# Patient Record
Sex: FEMALE | Race: WHITE | NOT HISPANIC OR LATINO | Employment: OTHER | URBAN - NONMETROPOLITAN AREA
[De-identification: names, ages, dates, MRNs, and addresses within clinical notes are randomized per-mention and may not be internally consistent; named-entity substitution may affect disease eponyms.]

---

## 2017-07-18 RX ORDER — LISINOPRIL 40 MG/1
TABLET ORAL
Qty: 30 TABLET | Refills: 6 | Status: SHIPPED | OUTPATIENT
Start: 2017-07-18 | End: 2018-02-27 | Stop reason: SDUPTHER

## 2017-07-18 RX ORDER — METOPROLOL TARTRATE 50 MG/1
TABLET, FILM COATED ORAL
Qty: 60 TABLET | Refills: 0 | Status: SHIPPED | OUTPATIENT
Start: 2017-07-18 | End: 2017-08-21 | Stop reason: SDUPTHER

## 2017-08-21 RX ORDER — METOPROLOL TARTRATE 50 MG/1
50 TABLET, FILM COATED ORAL 2 TIMES DAILY
Qty: 60 TABLET | Refills: 0 | Status: SHIPPED | OUTPATIENT
Start: 2017-08-21 | End: 2017-09-22 | Stop reason: SDUPTHER

## 2017-09-22 RX ORDER — METOPROLOL TARTRATE 50 MG/1
TABLET, FILM COATED ORAL
Qty: 60 TABLET | Refills: 0 | Status: SHIPPED | OUTPATIENT
Start: 2017-09-22 | End: 2017-11-18 | Stop reason: SDUPTHER

## 2017-10-13 RX ORDER — GABAPENTIN 100 MG/1
CAPSULE ORAL
Qty: 90 CAPSULE | Refills: 3 | OUTPATIENT
Start: 2017-10-13

## 2017-11-16 RX ORDER — METOPROLOL TARTRATE 50 MG/1
TABLET, FILM COATED ORAL
Qty: 60 TABLET | Refills: 0 | OUTPATIENT
Start: 2017-11-16

## 2017-11-20 RX ORDER — METOPROLOL TARTRATE 50 MG/1
TABLET, FILM COATED ORAL
Qty: 60 TABLET | Refills: 5 | Status: SHIPPED | OUTPATIENT
Start: 2017-11-20 | End: 2018-05-21 | Stop reason: SDUPTHER

## 2017-12-05 ENCOUNTER — OFFICE VISIT (OUTPATIENT)
Dept: FAMILY MEDICINE CLINIC | Facility: CLINIC | Age: 67
End: 2017-12-05

## 2017-12-05 VITALS
BODY MASS INDEX: 19.2 KG/M2 | HEART RATE: 56 BPM | OXYGEN SATURATION: 98 % | RESPIRATION RATE: 16 BRPM | SYSTOLIC BLOOD PRESSURE: 119 MMHG | DIASTOLIC BLOOD PRESSURE: 66 MMHG | TEMPERATURE: 97.6 F | WEIGHT: 105 LBS

## 2017-12-05 DIAGNOSIS — K59.09 OTHER CONSTIPATION: ICD-10-CM

## 2017-12-05 DIAGNOSIS — E11.9 TYPE 2 DIABETES MELLITUS WITHOUT COMPLICATION, WITHOUT LONG-TERM CURRENT USE OF INSULIN (HCC): ICD-10-CM

## 2017-12-05 DIAGNOSIS — I10 BENIGN ESSENTIAL HYPERTENSION: Primary | ICD-10-CM

## 2017-12-05 DIAGNOSIS — M54.50 CHRONIC BILATERAL LOW BACK PAIN WITHOUT SCIATICA: ICD-10-CM

## 2017-12-05 DIAGNOSIS — G89.29 CHRONIC BILATERAL LOW BACK PAIN WITHOUT SCIATICA: ICD-10-CM

## 2017-12-05 DIAGNOSIS — R63.4 WEIGHT LOSS: ICD-10-CM

## 2017-12-05 DIAGNOSIS — N39.0 ACUTE URINARY TRACT INFECTION: ICD-10-CM

## 2017-12-05 DIAGNOSIS — E78.2 MIXED HYPERLIPIDEMIA: ICD-10-CM

## 2017-12-05 PROCEDURE — 99214 OFFICE O/P EST MOD 30 MIN: CPT | Performed by: INTERNAL MEDICINE

## 2017-12-05 RX ORDER — OXYCODONE AND ACETAMINOPHEN 10; 325 MG/1; MG/1
TABLET ORAL
Refills: 0 | COMMUNITY
Start: 2017-11-11 | End: 2019-06-18

## 2017-12-05 NOTE — PROGRESS NOTES
Subjective   Glendy Garcia is a 67 y.o. female.     Chief Complaint   Patient presents with   • Hyperlipidemia   • Hypertension   • Diabetes   • Urinary Tract Infection   • Constipation   • Back Pain     Hpi:  Patient is here to follow up  on the  blood pressure and cholesterol and  sugar  . The patient  is taking the blood pressure  medications as prescribed and  has had no side effects. The patient  also needs refills on  medications . The patient  is also here to follow up  on  the  cholesterol  and  is trying to follow a diet. The patient is also here to follow on sugar and  Due for labs today , Patient also needs the medications to be refilled today.  She has not seen a cardiologist recently.  Patient advised to make a follow-up appointment.  She also complains of problems urinating with burning sensation.  She does follow-up with the pain clinic for chronic back pain ,  She is a current smoker, she  Got  a pneumovax,  She declines a flu shot ,  She complains of chronic constipation and the linzess helps her ,she is not eating and has lost weight ,she refuses to see specialist and refuses any further testing a this time  Hyperlipidemia   Pertinent negatives include no chest pain or shortness of breath.   Hypertension   Pertinent negatives include no chest pain, headaches, neck pain, palpitations or shortness of breath.   Diabetes   Pertinent negatives for hypoglycemia include no dizziness, headaches or nervousness/anxiousness. Pertinent negatives for diabetes include no chest pain, no fatigue and no weakness.      The following portions of the patient's history were reviewed and updated as appropriate: allergies, current medications, past family history, past medical history, past social history, past surgical history and problem list.    Review of Systems   Constitutional: Negative for appetite change, fatigue and fever.   HENT: Negative for congestion, ear discharge, ear pain, sinus pressure and sore throat.     Eyes: Negative for pain and discharge.   Respiratory: Negative for cough, chest tightness, shortness of breath and wheezing.    Cardiovascular: Negative for chest pain, palpitations and leg swelling.   Gastrointestinal: Positive for constipation. Negative for abdominal pain, blood in stool, diarrhea and nausea.   Endocrine: Negative for cold intolerance and heat intolerance.   Genitourinary: Positive for dysuria. Negative for flank pain and frequency.   Musculoskeletal: Positive for back pain. Negative for joint swelling and neck pain.   Skin: Negative for color change.   Allergic/Immunologic: Negative for environmental allergies and food allergies.   Neurological: Negative for dizziness, weakness, numbness and headaches.   Hematological: Negative for adenopathy. Does not bruise/bleed easily.   Psychiatric/Behavioral: Negative for behavioral problems and dysphoric mood. The patient is not nervous/anxious.          Current Outpatient Prescriptions:   •  albuterol (PROVENTIL HFA) 108 (90 BASE) MCG/ACT inhaler, Inhale 1 puff Every 6 (Six) Hours As Needed for wheezing., Disp: 1 inhaler, Rfl: 11  •  aspirin 325 MG tablet, Take 1 tablet by mouth., Disp: , Rfl:   •  Lancets (FREESTYLE) lancets, , Disp: , Rfl:   •  linaclotide (LINZESS) 145 MCG capsule capsule, Take 1 capsule (145 mcg total) by mouth Daily, Disp: 30 capsule, Rfl: 3  •  lisinopril (PRINIVIL,ZESTRIL) 40 MG tablet, take 1 tablet by mouth once daily, Disp: 30 tablet, Rfl: 6  •  metFORMIN (GLUCOPHAGE) 500 MG tablet, Take 1 tablet (500 mg total) by mouth Daily, Disp: 30 tablet, Rfl: 3  •  metoprolol tartrate (LOPRESSOR) 50 MG tablet, take 1 tablet by mouth twice a day, Disp: 60 tablet, Rfl: 5  •  Omega-3 Fatty Acids (OMEGA 3 PO), Take 2 capsules by mouth 3 (Three) Times a Day., Disp: , Rfl:   •  oxyCODONE-acetaminophen (PERCOCET)  MG per tablet, take 1 tablet by mouth four times a day, Disp: , Rfl: 0  •  rosuvastatin (CRESTOR) 40 MG tablet, Take 1 tablet  by mouth Daily., Disp: 30 tablet, Rfl: 6  •  tiotropium (SPIRIVA HANDIHALER) 18 MCG per inhalation capsule, Spiriva HandiHaler 18 MCG Inhalation Capsule; Patient Sig: Spiriva HandiHaler 18 MCG Inhalation Capsule USE AS DIRECTED.; 0; 28-Jan-2014; Active, Disp: , Rfl:     Objective     Blood pressure 119/66, pulse 56, temperature 97.6 °F (36.4 °C), temperature source Oral, resp. rate 16, weight 47.6 kg (105 lb), SpO2 98 %.    Physical Exam   Constitutional: She is oriented to person, place, and time. She appears well-developed and well-nourished. No distress.   HENT:   Head: Normocephalic and atraumatic.   Right Ear: External ear normal.   Left Ear: External ear normal.   Nose: Nose normal.   Mouth/Throat: Oropharynx is clear and moist.   Eyes: Conjunctivae and EOM are normal. Pupils are equal, round, and reactive to light.   Neck: Neck supple. No thyromegaly present.   Cardiovascular: Normal rate, regular rhythm and normal heart sounds.    Pulmonary/Chest: Effort normal and breath sounds normal. No respiratory distress.   Abdominal: Soft. Bowel sounds are normal. She exhibits no distension. There is no tenderness. There is no rebound.   Musculoskeletal: Normal range of motion. She exhibits no edema.   Lumbar tenderness   Lymphadenopathy:     She has no cervical adenopathy.   Neurological: She is alert and oriented to person, place, and time.   No gross motor or sensory deficits   Skin: Skin is warm. She is not diaphoretic.   Psychiatric: She has a normal mood and affect.   Nursing note and vitals reviewed.      Results for orders placed or performed in visit on 12/05/17   Urine Culture - Urine, Urine, Clean Catch   Result Value Ref Range    Urine Culture Final report     Result 1 No growth    Comprehensive Metabolic Panel   Result Value Ref Range    Glucose 92 74 - 98 mg/dL    BUN 19 7 - 20 mg/dL    Creatinine 1.20 0.60 - 1.30 mg/dL    eGFR Non African Am 45 (L) >60 mL/min/1.73    eGFR African Am 54 (L) >60  mL/min/1.73    BUN/Creatinine Ratio 15.8 7.1 - 23.5    Sodium 140 137 - 145 mmol/L    Potassium 5.2 (H) 3.5 - 5.1 mmol/L    Chloride 102 98 - 107 mmol/L    Total CO2 27.0 26.0 - 30.0 mmol/L    Calcium 10.6 (H) 8.4 - 10.2 mg/dL    Total Protein 7.4 6.3 - 8.2 g/dL    Albumin 4.50 3.50 - 5.00 g/dL    Globulin 2.9 gm/dL    A/G Ratio 1.6 1.0 - 2.0 g/dL    Total Bilirubin 0.6 0.2 - 1.3 mg/dL    Alkaline Phosphatase 52 38 - 126 U/L    AST (SGOT) 31 15 - 46 U/L    ALT (SGPT) 32 13 - 69 U/L   Lipid Panel   Result Value Ref Range    Total Cholesterol 127 0 - 199 mg/dL    Triglycerides 91 <150 mg/dL    HDL Cholesterol 61 (H) 40 - 60 mg/dL    VLDL Cholesterol 18.2 mg/dL    LDL Cholesterol  48 0 - 99 mg/dL   TSH   Result Value Ref Range    TSH 1.730 0.470 - 4.680 mIU/mL   Hemoglobin A1c   Result Value Ref Range    Hemoglobin A1C 5.90 %   CBC & Differential   Result Value Ref Range    WBC 10.55 4.80 - 10.80 10*3/mm3    RBC 3.94 (L) 4.20 - 5.40 10*6/mm3    Hemoglobin 12.1 12.0 - 16.0 g/dL    Hematocrit 37.5 37.0 - 47.0 %    MCV 95.2 81.0 - 99.0 fL    MCH 30.7 27.0 - 31.0 pg    MCHC 32.3 30.0 - 37.0 g/dL    RDW 13.1 11.5 - 14.5 %    Platelets 325 130 - 400 10*3/mm3    Neutrophil Rel % 49.6 37.0 - 80.0 %    Lymphocyte Rel % 36.6 10.0 - 50.0 %    Monocyte Rel % 8.1 0.0 - 12.0 %    Eosinophil Rel % 4.6 0.0 - 7.0 %    Basophil Rel % 0.9 0.0 - 2.5 %    Neutrophils Absolute 5.24 2.00 - 6.90 10*3/mm3    Lymphocytes Absolute 3.86 (H) 0.60 - 3.40 10*3/mm3    Monocytes Absolute 0.85 0.00 - 0.90 10*3/mm3    Eosinophils Absolute 0.49 0.00 - 0.70 10*3/mm3    Basophils Absolute 0.09 0.00 - 0.20 10*3/mm3    Immature Granulocyte Rel % 0.2 0.0 - 0.6 %    Immature Grans Absolute 0.02 0.00 - 0.06 10*3/mm3    nRBC 0.0 0.0 - 0.0 /100 WBC         Assessment/Plan   Glendy was seen today for hyperlipidemia, hypertension, diabetes, urinary tract infection, constipation and back pain.    Diagnoses and all orders for this visit:    Benign essential  hypertension  -     CBC & Differential  -     XR Chest PA & Lateral    Mixed hyperlipidemia  -     Comprehensive Metabolic Panel  -     Lipid Panel  -     CBC & Differential  -     Comprehensive Metabolic Panel  -     Lipid Panel    Type 2 diabetes mellitus without complication, without long-term current use of insulin  -     Hemoglobin A1c  -     Hemoglobin A1c    Acute urinary tract infection  -     Urine Culture - Urine, Urine, Clean Catch    Weight loss  -     TSH    Other constipation    Chronic bilateral low back pain without sciatica    Other orders  -     linaclotide (LINZESS) 145 MCG capsule capsule; Take 1 capsule (145 mcg total) by mouth Daily  -     metFORMIN (GLUCOPHAGE) 500 MG tablet; Take 1 tablet (500 mg total) by mouth Daily        Discussion Summary:   1. benign essential hypertension: Will continue current medication, low-sodium diet advise. Will get a chest xray today  2.back pain: To continue per pain clinic. She had 5 surgeries with dr newman  3.diabetes mellitus: We will decrease to metformin 500mg po daily , and reviewed fasting sugar and A1c - 6.0 , microalbumin today  4. Mixed hyperlipidemia: Will  Continue current medications  and continue low-cholesterol diet, reviewed fasting lipid panel today   5. acute uti : will get   urine culture , oral hydration ,     6. Weight loss : will get labs , she declines ct scan or referral to surgery /GI or any other specialist   7. Constipation : will get labs , continue linzess , oral hydration       Genny Delgado MD

## 2017-12-07 LAB
ALBUMIN SERPL-MCNC: 4.5 G/DL (ref 3.5–5)
ALBUMIN/GLOB SERPL: 1.6 G/DL (ref 1–2)
ALP SERPL-CCNC: 52 U/L (ref 38–126)
ALT SERPL-CCNC: 32 U/L (ref 13–69)
AST SERPL-CCNC: 31 U/L (ref 15–46)
BACTERIA UR CULT: NO GROWTH
BACTERIA UR CULT: NORMAL
BASOPHILS # BLD AUTO: 0.09 10*3/MM3 (ref 0–0.2)
BASOPHILS NFR BLD AUTO: 0.9 % (ref 0–2.5)
BILIRUB SERPL-MCNC: 0.6 MG/DL (ref 0.2–1.3)
BUN SERPL-MCNC: 19 MG/DL (ref 7–20)
BUN/CREAT SERPL: 15.8 (ref 7.1–23.5)
CALCIUM SERPL-MCNC: 10.6 MG/DL (ref 8.4–10.2)
CHLORIDE SERPL-SCNC: 102 MMOL/L (ref 98–107)
CHOLEST SERPL-MCNC: 127 MG/DL (ref 0–199)
CO2 SERPL-SCNC: 27 MMOL/L (ref 26–30)
CREAT SERPL-MCNC: 1.2 MG/DL (ref 0.6–1.3)
EOSINOPHIL # BLD AUTO: 0.49 10*3/MM3 (ref 0–0.7)
EOSINOPHIL NFR BLD AUTO: 4.6 % (ref 0–7)
ERYTHROCYTE [DISTWIDTH] IN BLOOD BY AUTOMATED COUNT: 13.1 % (ref 11.5–14.5)
GFR SERPLBLD CREATININE-BSD FMLA CKD-EPI: 45 ML/MIN/1.73
GFR SERPLBLD CREATININE-BSD FMLA CKD-EPI: 54 ML/MIN/1.73
GLOBULIN SER CALC-MCNC: 2.9 GM/DL
GLUCOSE SERPL-MCNC: 92 MG/DL (ref 74–98)
HBA1C MFR BLD: 5.9 %
HCT VFR BLD AUTO: 37.5 % (ref 37–47)
HDLC SERPL-MCNC: 61 MG/DL (ref 40–60)
HGB BLD-MCNC: 12.1 G/DL (ref 12–16)
IMM GRANULOCYTES # BLD: 0.02 10*3/MM3 (ref 0–0.06)
IMM GRANULOCYTES NFR BLD: 0.2 % (ref 0–0.6)
LDLC SERPL CALC-MCNC: 48 MG/DL (ref 0–99)
LYMPHOCYTES # BLD AUTO: 3.86 10*3/MM3 (ref 0.6–3.4)
LYMPHOCYTES NFR BLD AUTO: 36.6 % (ref 10–50)
MCH RBC QN AUTO: 30.7 PG (ref 27–31)
MCHC RBC AUTO-ENTMCNC: 32.3 G/DL (ref 30–37)
MCV RBC AUTO: 95.2 FL (ref 81–99)
MONOCYTES # BLD AUTO: 0.85 10*3/MM3 (ref 0–0.9)
MONOCYTES NFR BLD AUTO: 8.1 % (ref 0–12)
NEUTROPHILS # BLD AUTO: 5.24 10*3/MM3 (ref 2–6.9)
NEUTROPHILS NFR BLD AUTO: 49.6 % (ref 37–80)
NRBC BLD AUTO-RTO: 0 /100 WBC (ref 0–0)
PLATELET # BLD AUTO: 325 10*3/MM3 (ref 130–400)
POTASSIUM SERPL-SCNC: 5.2 MMOL/L (ref 3.5–5.1)
PROT SERPL-MCNC: 7.4 G/DL (ref 6.3–8.2)
RBC # BLD AUTO: 3.94 10*6/MM3 (ref 4.2–5.4)
SODIUM SERPL-SCNC: 140 MMOL/L (ref 137–145)
TRIGL SERPL-MCNC: 91 MG/DL
TSH SERPL DL<=0.005 MIU/L-ACNC: 1.73 MIU/ML (ref 0.47–4.68)
VLDLC SERPL CALC-MCNC: 18.2 MG/DL
WBC # BLD AUTO: 10.55 10*3/MM3 (ref 4.8–10.8)

## 2017-12-11 ENCOUNTER — TELEPHONE (OUTPATIENT)
Dept: FAMILY MEDICINE CLINIC | Facility: CLINIC | Age: 67
End: 2017-12-11

## 2017-12-12 ENCOUNTER — TELEPHONE (OUTPATIENT)
Dept: FAMILY MEDICINE CLINIC | Facility: CLINIC | Age: 67
End: 2017-12-12

## 2017-12-13 NOTE — TELEPHONE ENCOUNTER
Attempted to contact the patient but she has never answered or returned phone call request.  Left her a general message that all of her pain medications need to come from the pain center.

## 2018-02-05 ENCOUNTER — TELEPHONE (OUTPATIENT)
Dept: INTERNAL MEDICINE | Facility: CLINIC | Age: 68
End: 2018-02-05

## 2018-02-05 NOTE — TELEPHONE ENCOUNTER
Patient called and states she knows she has diverticulits and wants to get something called in and labs ordered. She declined appointment and states she can not make it to [facility] and wants to speak with nurse.

## 2018-02-08 ENCOUNTER — OFFICE VISIT (OUTPATIENT)
Dept: INTERNAL MEDICINE | Facility: CLINIC | Age: 68
End: 2018-02-08

## 2018-02-08 VITALS
WEIGHT: 106 LBS | DIASTOLIC BLOOD PRESSURE: 59 MMHG | SYSTOLIC BLOOD PRESSURE: 101 MMHG | HEART RATE: 57 BPM | BODY MASS INDEX: 19.39 KG/M2 | OXYGEN SATURATION: 100 % | TEMPERATURE: 98 F | RESPIRATION RATE: 16 BRPM

## 2018-02-08 DIAGNOSIS — I10 BENIGN ESSENTIAL HYPERTENSION: Primary | ICD-10-CM

## 2018-02-08 DIAGNOSIS — N39.0 ACUTE URINARY TRACT INFECTION: ICD-10-CM

## 2018-02-08 DIAGNOSIS — Z79.899 ON LONG TERM DRUG THERAPY: ICD-10-CM

## 2018-02-08 DIAGNOSIS — R10.9 LEFT FLANK PAIN: ICD-10-CM

## 2018-02-08 DIAGNOSIS — N89.8 VAGINAL DISCHARGE: ICD-10-CM

## 2018-02-08 DIAGNOSIS — K59.04 CHRONIC IDIOPATHIC CONSTIPATION: ICD-10-CM

## 2018-02-08 DIAGNOSIS — R10.84 GENERALIZED ABDOMINAL PAIN: ICD-10-CM

## 2018-02-08 LAB
ALBUMIN SERPL-MCNC: 4.7 G/DL (ref 3.5–5)
ALBUMIN/GLOB SERPL: 1.6 G/DL (ref 1–2)
ALP SERPL-CCNC: 54 U/L (ref 38–126)
ALT SERPL-CCNC: 30 U/L (ref 13–69)
AST SERPL-CCNC: 28 U/L (ref 15–46)
BASOPHILS # BLD AUTO: 0.07 10*3/MM3 (ref 0–0.2)
BASOPHILS NFR BLD AUTO: 0.7 % (ref 0–2.5)
BILIRUB BLD-MCNC: NEGATIVE MG/DL
BILIRUB SERPL-MCNC: 0.5 MG/DL (ref 0.2–1.3)
BUN SERPL-MCNC: 23 MG/DL (ref 7–20)
BUN/CREAT SERPL: 20.9 (ref 7.1–23.5)
CALCIUM SERPL-MCNC: 10.3 MG/DL (ref 8.4–10.2)
CHLORIDE SERPL-SCNC: 99 MMOL/L (ref 98–107)
CLARITY, POC: CLEAR
CO2 SERPL-SCNC: 29 MMOL/L (ref 26–30)
COLOR UR: YELLOW
CREAT SERPL-MCNC: 1.1 MG/DL (ref 0.6–1.3)
EOSINOPHIL # BLD AUTO: 0.52 10*3/MM3 (ref 0–0.7)
EOSINOPHIL NFR BLD AUTO: 4.9 % (ref 0–7)
ERYTHROCYTE [DISTWIDTH] IN BLOOD BY AUTOMATED COUNT: 13.5 % (ref 11.5–14.5)
GFR SERPLBLD CREATININE-BSD FMLA CKD-EPI: 50 ML/MIN/1.73
GFR SERPLBLD CREATININE-BSD FMLA CKD-EPI: 60 ML/MIN/1.73
GLOBULIN SER CALC-MCNC: 2.9 GM/DL
GLUCOSE SERPL-MCNC: 86 MG/DL (ref 74–98)
GLUCOSE UR STRIP-MCNC: NEGATIVE MG/DL
HCT VFR BLD AUTO: 37.5 % (ref 37–47)
HGB BLD-MCNC: 12.2 G/DL (ref 12–16)
IMM GRANULOCYTES # BLD: 0.03 10*3/MM3 (ref 0–0.06)
IMM GRANULOCYTES NFR BLD: 0.3 % (ref 0–0.6)
KETONES UR QL: NEGATIVE
LEUKOCYTE EST, POC: NEGATIVE
LYMPHOCYTES # BLD AUTO: 4.27 10*3/MM3 (ref 0.6–3.4)
LYMPHOCYTES NFR BLD AUTO: 40.4 % (ref 10–50)
MCH RBC QN AUTO: 31.3 PG (ref 27–31)
MCHC RBC AUTO-ENTMCNC: 32.5 G/DL (ref 30–37)
MCV RBC AUTO: 96.2 FL (ref 81–99)
MONOCYTES # BLD AUTO: 0.87 10*3/MM3 (ref 0–0.9)
MONOCYTES NFR BLD AUTO: 8.2 % (ref 0–12)
NEUTROPHILS # BLD AUTO: 4.81 10*3/MM3 (ref 2–6.9)
NEUTROPHILS NFR BLD AUTO: 45.5 % (ref 37–80)
NITRITE UR-MCNC: NEGATIVE MG/ML
NRBC BLD AUTO-RTO: 0 /100 WBC (ref 0–0)
PH UR: 6 [PH] (ref 5–8)
PLATELET # BLD AUTO: 321 10*3/MM3 (ref 130–400)
POTASSIUM SERPL-SCNC: 5.2 MMOL/L (ref 3.5–5.1)
PROT SERPL-MCNC: 7.6 G/DL (ref 6.3–8.2)
PROT UR STRIP-MCNC: NEGATIVE MG/DL
RBC # BLD AUTO: 3.9 10*6/MM3 (ref 4.2–5.4)
RBC # UR STRIP: NEGATIVE /UL
SODIUM SERPL-SCNC: 141 MMOL/L (ref 137–145)
SP GR UR: 1.01 (ref 1–1.03)
UROBILINOGEN UR QL: NORMAL
WBC # BLD AUTO: 10.57 10*3/MM3 (ref 4.8–10.8)

## 2018-02-08 PROCEDURE — 81003 URINALYSIS AUTO W/O SCOPE: CPT | Performed by: INTERNAL MEDICINE

## 2018-02-08 PROCEDURE — 96372 THER/PROPH/DIAG INJ SC/IM: CPT | Performed by: INTERNAL MEDICINE

## 2018-02-08 PROCEDURE — 99214 OFFICE O/P EST MOD 30 MIN: CPT | Performed by: INTERNAL MEDICINE

## 2018-02-08 RX ORDER — KETOROLAC TROMETHAMINE 30 MG/ML
30 INJECTION, SOLUTION INTRAMUSCULAR; INTRAVENOUS ONCE
Status: COMPLETED | OUTPATIENT
Start: 2018-02-08 | End: 2018-02-08

## 2018-02-08 RX ORDER — CIPROFLOXACIN 500 MG/1
500 TABLET, FILM COATED ORAL 2 TIMES DAILY
Qty: 20 TABLET | Refills: 0 | Status: SHIPPED | OUTPATIENT
Start: 2018-02-08 | End: 2018-02-18

## 2018-02-08 RX ADMIN — KETOROLAC TROMETHAMINE 30 MG: 30 INJECTION, SOLUTION INTRAMUSCULAR; INTRAVENOUS at 16:34

## 2018-02-08 NOTE — PROGRESS NOTES
Subjective   Glendy Garcia is a 67 y.o. female.     Chief Complaint   Patient presents with   • Back Pain     Patient complains of worsening left mid back pain.    • Hypertension   • Urinary Tract Infection   • Abdominal Pain   • Nausea   • Flank Pain   • Constipation       History of Present Illness   Patient is here to follow-up on her blood pressure.  She is also here complaining of abdominal pain.  She has chronic abdominal pain.  She has chronic constipation.  She has not followed up with GI.  She also complains of left flank pain since the past week  .  She has a UTI.  She also complains of yellow vaginal discharge.  Patient states that her symptoms have been worsening    The following portions of the patient's history were reviewed and updated as appropriate: allergies, current medications, past family history, past medical history, past social history, past surgical history and problem list.    Review of Systems   Constitutional: Negative for appetite change, fatigue and fever.   HENT: Negative for congestion, ear discharge, ear pain, sinus pressure and sore throat.    Eyes: Negative for pain and discharge.   Respiratory: Negative for cough, chest tightness, shortness of breath and wheezing.    Cardiovascular: Negative for chest pain, palpitations and leg swelling.   Gastrointestinal: Positive for abdominal pain and constipation. Negative for blood in stool, diarrhea and nausea.   Endocrine: Negative for cold intolerance and heat intolerance.   Genitourinary: Positive for flank pain and vaginal discharge.        Uti   Musculoskeletal: Negative for back pain and joint swelling.   Skin: Negative for color change.   Allergic/Immunologic: Negative for environmental allergies and food allergies.   Neurological: Negative for dizziness, weakness, numbness and headaches.   Hematological: Negative for adenopathy. Does not bruise/bleed easily.   Psychiatric/Behavioral: Negative for behavioral problems and dysphoric  mood. The patient is not nervous/anxious.          Current Outpatient Prescriptions:   •  albuterol (PROVENTIL HFA) 108 (90 BASE) MCG/ACT inhaler, Inhale 1 puff Every 6 (Six) Hours As Needed for wheezing., Disp: 1 inhaler, Rfl: 11  •  aspirin 325 MG tablet, Take 1 tablet by mouth., Disp: , Rfl:   •  Lancets (FREESTYLE) lancets, , Disp: , Rfl:   •  linaclotide (LINZESS) 145 MCG capsule capsule, Take 1 capsule (145 mcg total) by mouth Daily, Disp: 30 capsule, Rfl: 3  •  lisinopril (PRINIVIL,ZESTRIL) 40 MG tablet, take 1 tablet by mouth once daily, Disp: 30 tablet, Rfl: 6  •  metFORMIN (GLUCOPHAGE) 500 MG tablet, Take 1 tablet (500 mg total) by mouth Daily, Disp: 30 tablet, Rfl: 3  •  metoprolol tartrate (LOPRESSOR) 50 MG tablet, take 1 tablet by mouth twice a day, Disp: 60 tablet, Rfl: 5  •  Omega-3 Fatty Acids (OMEGA 3 PO), Take 2 capsules by mouth 3 (Three) Times a Day., Disp: , Rfl:   •  oxyCODONE-acetaminophen (PERCOCET)  MG per tablet, take 1 tablet by mouth four times a day, Disp: , Rfl: 0  •  rosuvastatin (CRESTOR) 40 MG tablet, Take 1 tablet by mouth Daily., Disp: 30 tablet, Rfl: 6  •  tiotropium (SPIRIVA HANDIHALER) 18 MCG per inhalation capsule, Spiriva HandiHaler 18 MCG Inhalation Capsule; Patient Sig: Spiriva HandiHaler 18 MCG Inhalation Capsule USE AS DIRECTED.; 0; 28-Jan-2014; Active, Disp: , Rfl:   •  ciprofloxacin (CIPRO) 500 MG tablet, Take 1 tablet by mouth 2 (Two) Times a Day for 10 days., Disp: 20 tablet, Rfl: 0  No current facility-administered medications for this visit.     Objective     Blood pressure 101/59, pulse 57, temperature 98 °F (36.7 °C), temperature source Oral, resp. rate 16, weight 48.1 kg (106 lb), SpO2 100 %.    Physical Exam   Constitutional: She is oriented to person, place, and time. She appears well-developed and well-nourished. No distress.   HENT:   Head: Normocephalic and atraumatic.   Right Ear: External ear normal.   Left Ear: External ear normal.   Nose: Nose  normal.   Mouth/Throat: Oropharynx is clear and moist.   Eyes: Conjunctivae and EOM are normal. Pupils are equal, round, and reactive to light.   Neck: Neck supple. No thyromegaly present.   Cardiovascular: Normal rate, regular rhythm and normal heart sounds.    Pulmonary/Chest: Effort normal and breath sounds normal. No respiratory distress.   Abdominal: Soft. Bowel sounds are normal. She exhibits no distension. There is no tenderness. There is no rebound.   Musculoskeletal: Normal range of motion. She exhibits no edema.   Left flank tenderness   Lymphadenopathy:     She has no cervical adenopathy.   Neurological: She is alert and oriented to person, place, and time.   No gross motor or sensory deficits   Skin: Skin is warm. She is not diaphoretic.   Psychiatric: She has a normal mood and affect.   Nursing note and vitals reviewed.      Results for orders placed or performed in visit on 02/08/18   POCT urinalysis dipstick, automated   Result Value Ref Range    Color Yellow Yellow, Straw, Dark Yellow, Elaine    Clarity, UA Clear Clear    Glucose, UA Negative Negative, 1000 mg/dL (3+) mg/dL    Bilirubin Negative Negative    Ketones, UA Negative Negative    Specific Gravity  1.015 1.005 - 1.030    Blood, UA Negative Negative    pH, Urine 6.0 5.0 - 8.0    Protein, POC Negative Negative mg/dL    Urobilinogen, UA Normal Normal    Leukocytes Negative Negative    Nitrite, UA Negative Negative         Assessment/Plan   Glendy was seen today for back pain, hypertension, urinary tract infection, abdominal pain, nausea, flank pain and constipation.    Diagnoses and all orders for this visit:    Benign essential hypertension    Left flank pain  -     CT Abdomen Pelvis With Contrast  -     ketorolac (TORADOL) injection 30 mg; Inject 30 mg into the shoulder, thigh, or buttocks 1 (One) Time.    Chronic idiopathic constipation  -     CT Abdomen Pelvis With Contrast  -     Ambulatory Referral to Gastroenterology    Vaginal  discharge  -     Ambulatory Referral to Gynecology    Generalized abdominal pain  -     CBC & Differential  -     Comprehensive Metabolic Panel    Acute urinary tract infection  -     POCT urinalysis dipstick, automated  -     Urine Culture - Urine, Urine, Clean Catch    Other orders  -     ciprofloxacin (CIPRO) 500 MG tablet; Take 1 tablet by mouth 2 (Two) Times a Day for 10 days.    Plan:  1.  Benign essential hypertension: Will continue current medication, low-sodium diet advised  2.Left flank pain: We will obtain CAT scan, I m Toradol given in office today  3.abdominal pain: We will obtain labs, CAT scan and refer to GI  4.  Vaginal discharge: Will refer patient to gynecology  5.acute urinary tract infection: In office UA done, will obtain urine culture, oral hydration, start Cipro  6.  Chronic constipation: Oral hydration advised, we will obtain CAT scan and labs and refer patient to GI  Patient advised to the ER for symptoms worsen         Genny Delgado MD

## 2018-02-11 LAB
BACTERIA UR CULT: NORMAL
BACTERIA UR CULT: NORMAL

## 2018-02-13 ENCOUNTER — HOSPITAL ENCOUNTER (OUTPATIENT)
Dept: CT IMAGING | Facility: HOSPITAL | Age: 68
Discharge: HOME | End: 2018-02-13
Admitting: INTERNAL MEDICINE

## 2018-02-13 PROCEDURE — A9270 NON-COVERED ITEM OR SERVICE: HCPCS | Performed by: INTERNAL MEDICINE

## 2018-02-13 PROCEDURE — 63710000001 BARIUM 2 % SUSPENSION: Performed by: INTERNAL MEDICINE

## 2018-02-13 PROCEDURE — 0 IOPAMIDOL 61 % SOLUTION: Performed by: INTERNAL MEDICINE

## 2018-02-13 PROCEDURE — 74177 CT ABD & PELVIS W/CONTRAST: CPT

## 2018-02-13 RX ADMIN — BARIUM SULFATE 450 ML: 21 SUSPENSION ORAL at 13:30

## 2018-02-13 RX ADMIN — IOPAMIDOL 95 ML: 612 INJECTION, SOLUTION INTRAVENOUS at 14:40

## 2018-02-15 ENCOUNTER — TELEPHONE (OUTPATIENT)
Dept: INTERNAL MEDICINE | Facility: CLINIC | Age: 68
End: 2018-02-15

## 2018-02-15 NOTE — TELEPHONE ENCOUNTER
Pt wants to know if she needs to continue taking antibiotics since her CT came back normal.  Please return call to pt.

## 2018-02-16 NOTE — TELEPHONE ENCOUNTER
Phone number listed [phone] is still not accepting calls, I left a detailed message on [phone] explaining instructions and to call back with any questions.

## 2018-02-27 ENCOUNTER — TELEPHONE (OUTPATIENT)
Dept: FAMILY MEDICINE CLINIC | Facility: CLINIC | Age: 68
End: 2018-02-27

## 2018-02-27 RX ORDER — LISINOPRIL 40 MG/1
TABLET ORAL
Qty: 30 TABLET | Refills: 6 | Status: SHIPPED | OUTPATIENT
Start: 2018-02-27 | End: 2018-09-27 | Stop reason: SDUPTHER

## 2018-02-27 NOTE — TELEPHONE ENCOUNTER
Patient is calling concerning her Lisinopril. She took her last one this morning.      [facility] / [place]

## 2018-03-15 RX ORDER — ROSUVASTATIN CALCIUM 40 MG/1
TABLET, COATED ORAL
Qty: 30 TABLET | Refills: 6 | Status: SHIPPED | OUTPATIENT
Start: 2018-03-15 | End: 2018-11-20 | Stop reason: SDUPTHER

## 2018-05-21 RX ORDER — METOPROLOL TARTRATE 50 MG/1
50 TABLET, FILM COATED ORAL 2 TIMES DAILY
Qty: 60 TABLET | Refills: 2 | Status: SHIPPED | OUTPATIENT
Start: 2018-05-21 | End: 2018-08-20 | Stop reason: SDUPTHER

## 2018-08-21 RX ORDER — METOPROLOL TARTRATE 50 MG/1
TABLET, FILM COATED ORAL
Qty: 60 TABLET | Refills: 2 | Status: SHIPPED | OUTPATIENT
Start: 2018-08-21 | End: 2018-11-20 | Stop reason: SDUPTHER

## 2018-09-28 RX ORDER — LISINOPRIL 40 MG/1
TABLET ORAL
Qty: 30 TABLET | Refills: 6 | Status: SHIPPED | OUTPATIENT
Start: 2018-09-28 | End: 2018-11-20 | Stop reason: SDUPTHER

## 2018-11-20 ENCOUNTER — OFFICE VISIT (OUTPATIENT)
Dept: INTERNAL MEDICINE | Facility: CLINIC | Age: 68
End: 2018-11-20

## 2018-11-20 VITALS
HEART RATE: 69 BPM | BODY MASS INDEX: 20.67 KG/M2 | DIASTOLIC BLOOD PRESSURE: 63 MMHG | WEIGHT: 113 LBS | OXYGEN SATURATION: 100 % | TEMPERATURE: 97.6 F | SYSTOLIC BLOOD PRESSURE: 109 MMHG

## 2018-11-20 DIAGNOSIS — I10 BENIGN ESSENTIAL HYPERTENSION: Primary | ICD-10-CM

## 2018-11-20 DIAGNOSIS — E11.9 CONTROLLED TYPE 2 DIABETES MELLITUS WITHOUT COMPLICATION, WITHOUT LONG-TERM CURRENT USE OF INSULIN (HCC): ICD-10-CM

## 2018-11-20 DIAGNOSIS — Z23 NEED FOR IMMUNIZATION AGAINST INFLUENZA: ICD-10-CM

## 2018-11-20 DIAGNOSIS — K59.00 CONSTIPATION, UNSPECIFIED CONSTIPATION TYPE: ICD-10-CM

## 2018-11-20 DIAGNOSIS — E78.2 MIXED HYPERLIPIDEMIA: ICD-10-CM

## 2018-11-20 DIAGNOSIS — Z12.31 ENCOUNTER FOR SCREENING MAMMOGRAM FOR MALIGNANT NEOPLASM OF BREAST: ICD-10-CM

## 2018-11-20 LAB
ALBUMIN SERPL-MCNC: 4.7 G/DL (ref 3.5–5)
ALBUMIN/GLOB SERPL: 1.8 G/DL (ref 1–2)
ALP SERPL-CCNC: 51 U/L (ref 38–126)
ALT SERPL-CCNC: 30 U/L (ref 13–69)
AST SERPL-CCNC: 38 U/L (ref 15–46)
BASOPHILS # BLD AUTO: 0.07 10*3/MM3 (ref 0–0.2)
BASOPHILS NFR BLD AUTO: 0.7 % (ref 0–2.5)
BILIRUB SERPL-MCNC: 0.5 MG/DL (ref 0.2–1.3)
BUN SERPL-MCNC: 22 MG/DL (ref 7–20)
BUN/CREAT SERPL: 16.9 (ref 7.1–23.5)
CALCIUM SERPL-MCNC: 9.7 MG/DL (ref 8.4–10.2)
CHLORIDE SERPL-SCNC: 95 MMOL/L (ref 98–107)
CHOLEST SERPL-MCNC: 109 MG/DL (ref 0–199)
CO2 SERPL-SCNC: 26 MMOL/L (ref 26–30)
CREAT SERPL-MCNC: 1.3 MG/DL (ref 0.6–1.3)
EOSINOPHIL # BLD AUTO: 0.73 10*3/MM3 (ref 0–0.7)
EOSINOPHIL NFR BLD AUTO: 7.3 % (ref 0–7)
ERYTHROCYTE [DISTWIDTH] IN BLOOD BY AUTOMATED COUNT: 13.4 % (ref 11.5–14.5)
GLOBULIN SER CALC-MCNC: 2.6 GM/DL
GLUCOSE SERPL-MCNC: 88 MG/DL (ref 74–98)
HBA1C MFR BLD: 6.2 %
HCT VFR BLD AUTO: 37.5 % (ref 37–47)
HDLC SERPL-MCNC: 53 MG/DL (ref 40–60)
HGB BLD-MCNC: 12.4 G/DL (ref 12–16)
IMM GRANULOCYTES # BLD: 0.04 10*3/MM3 (ref 0–0.06)
IMM GRANULOCYTES NFR BLD: 0.4 % (ref 0–0.6)
LDLC SERPL CALC-MCNC: 40 MG/DL (ref 0–99)
LYMPHOCYTES # BLD AUTO: 3.62 10*3/MM3 (ref 0.6–3.4)
LYMPHOCYTES NFR BLD AUTO: 36.2 % (ref 10–50)
MCH RBC QN AUTO: 31.2 PG (ref 27–31)
MCHC RBC AUTO-ENTMCNC: 33.1 G/DL (ref 30–37)
MCV RBC AUTO: 94.5 FL (ref 81–99)
MONOCYTES # BLD AUTO: 0.78 10*3/MM3 (ref 0–0.9)
MONOCYTES NFR BLD AUTO: 7.8 % (ref 0–12)
NEUTROPHILS # BLD AUTO: 4.76 10*3/MM3 (ref 2–6.9)
NEUTROPHILS NFR BLD AUTO: 47.6 % (ref 37–80)
NRBC BLD AUTO-RTO: 0 /100 WBC (ref 0–0)
PLATELET # BLD AUTO: 298 10*3/MM3 (ref 130–400)
POTASSIUM SERPL-SCNC: 4.8 MMOL/L (ref 3.5–5.1)
PROT SERPL-MCNC: 7.3 G/DL (ref 6.3–8.2)
RBC # BLD AUTO: 3.97 10*6/MM3 (ref 4.2–5.4)
SODIUM SERPL-SCNC: 130 MMOL/L (ref 137–145)
TRIGL SERPL-MCNC: 80 MG/DL
VLDLC SERPL CALC-MCNC: 16 MG/DL
WBC # BLD AUTO: 10 10*3/MM3 (ref 4.8–10.8)

## 2018-11-20 PROCEDURE — 99214 OFFICE O/P EST MOD 30 MIN: CPT | Performed by: INTERNAL MEDICINE

## 2018-11-20 PROCEDURE — G0008 ADMIN INFLUENZA VIRUS VAC: HCPCS | Performed by: INTERNAL MEDICINE

## 2018-11-20 PROCEDURE — 90662 IIV NO PRSV INCREASED AG IM: CPT | Performed by: INTERNAL MEDICINE

## 2018-11-20 RX ORDER — ROSUVASTATIN CALCIUM 40 MG/1
40 TABLET, COATED ORAL NIGHTLY
Qty: 30 TABLET | Refills: 6 | Status: SHIPPED | OUTPATIENT
Start: 2018-11-20 | End: 2019-10-16 | Stop reason: SDUPTHER

## 2018-11-20 RX ORDER — METOPROLOL TARTRATE 50 MG/1
50 TABLET, FILM COATED ORAL 2 TIMES DAILY
Qty: 60 TABLET | Refills: 5 | Status: SHIPPED | OUTPATIENT
Start: 2018-11-20 | End: 2019-05-22 | Stop reason: SDUPTHER

## 2018-11-20 RX ORDER — LISINOPRIL 40 MG/1
40 TABLET ORAL DAILY
Qty: 30 TABLET | Refills: 6 | Status: SHIPPED | OUTPATIENT
Start: 2018-11-20 | End: 2019-06-28 | Stop reason: SDUPTHER

## 2018-11-20 NOTE — PROGRESS NOTES
Subjective   Glendy Garcia is a 68 y.o. female.     Chief Complaint   Patient presents with   • Diabetes   • Constipation     discuss Linzess    • Hyperlipidemia   • Hypertension       History of Present Illness   HPI: Patient is here to follow up on the blood pressure  The patient is taking the blood pressure medications as prescribed and has had no side effects. The patient is also here to follow up on the cholesterol and is trying to follow a diet. The patient is also here to follow on sugar and  due to get lab work done . The patient also needs refills on medications .  She needs a flu shot and mammogram  Hypertension   Pertinent negatives include no chest pain, palpitations or shortness of breath.   Hyperlipidemia   Pertinent negatives include no chest pain or shortness of breath.   Diabetes   Pertinent negatives for hypoglycemia include no dizziness, nervousness/anxiousness or pallor. Pertinent negatives for diabetes include no chest pain, no shortness of breath  Patient is on acei/arb     The following portions of the patient's history were reviewed and updated as appropriate: allergies, current medications, past family history, past medical history, past social history, past surgical history and problem list.    Review of Systems   Constitutional: Negative for appetite change, fatigue and fever.   HENT: Negative for congestion, ear discharge, ear pain, sinus pressure and sore throat.    Eyes: Negative for pain and discharge.   Respiratory: Negative for cough, chest tightness, shortness of breath and wheezing.    Cardiovascular: Negative for chest pain, palpitations and leg swelling.   Gastrointestinal: Positive for constipation. Negative for abdominal pain, blood in stool, diarrhea and nausea.   Endocrine: Negative for cold intolerance and heat intolerance.   Genitourinary: Negative for dysuria, flank pain and frequency.   Musculoskeletal: Negative for back pain and joint swelling.   Skin: Negative for color  change.   Allergic/Immunologic: Negative for environmental allergies and food allergies.   Neurological: Negative for dizziness, weakness, numbness and headaches.   Hematological: Negative for adenopathy. Does not bruise/bleed easily.   Psychiatric/Behavioral: Negative for behavioral problems and dysphoric mood. The patient is not nervous/anxious.          Current Outpatient Medications:   •  albuterol (PROVENTIL HFA) 108 (90 BASE) MCG/ACT inhaler, Inhale 1 puff Every 6 (Six) Hours As Needed for wheezing., Disp: 1 inhaler, Rfl: 11  •  aspirin 325 MG tablet, Take 1 tablet by mouth., Disp: , Rfl:   •  Lancets (FREESTYLE) lancets, , Disp: , Rfl:   •  linaclotide (LINZESS) 145 MCG capsule capsule, Take 1 capsule by mouth Daily., Disp: 30 capsule, Rfl: 3  •  lisinopril (PRINIVIL,ZESTRIL) 40 MG tablet, Take 1 tablet by mouth Daily., Disp: 30 tablet, Rfl: 6  •  metFORMIN (GLUCOPHAGE) 500 MG tablet, Take 1 tablet by mouth Daily., Disp: 30 tablet, Rfl: 5  •  metoprolol tartrate (LOPRESSOR) 50 MG tablet, Take 1 tablet by mouth 2 (Two) Times a Day., Disp: 60 tablet, Rfl: 5  •  Omega-3 Fatty Acids (OMEGA 3 PO), Take 2 capsules by mouth 3 (Three) Times a Day., Disp: , Rfl:   •  oxyCODONE-acetaminophen (PERCOCET)  MG per tablet, take 1 tablet by mouth four times a day, Disp: , Rfl: 0  •  rosuvastatin (CRESTOR) 40 MG tablet, Take 1 tablet by mouth Every Night., Disp: 30 tablet, Rfl: 6  •  tiotropium (SPIRIVA HANDIHALER) 18 MCG per inhalation capsule, Spiriva HandiHaler 18 MCG Inhalation Capsule; Patient Sig: Spiriva HandiHaler 18 MCG Inhalation Capsule USE AS DIRECTED.; 0; 28-Jan-2014; Active, Disp: , Rfl:     Objective     Blood pressure 109/63, pulse 69, temperature 97.6 °F (36.4 °C), temperature source Temporal, weight 51.3 kg (113 lb), SpO2 100 %, not currently breastfeeding.    Physical Exam   Constitutional: She is oriented to person, place, and time. She appears well-developed and well-nourished. No distress.   HENT:    Head: Normocephalic and atraumatic.   Right Ear: External ear normal.   Left Ear: External ear normal.   Nose: Nose normal.   Mouth/Throat: Oropharynx is clear and moist.   Eyes: Conjunctivae and EOM are normal. Pupils are equal, round, and reactive to light.   Neck: Neck supple. No thyromegaly present.   Cardiovascular: Normal rate, regular rhythm and normal heart sounds.   Pulmonary/Chest: Effort normal and breath sounds normal. No respiratory distress.   Abdominal: Soft. Bowel sounds are normal. She exhibits no distension. There is no tenderness. There is no rebound.   Musculoskeletal: She exhibits no edema.   Walks with walker   Lymphadenopathy:     She has no cervical adenopathy.   Neurological: She is alert and oriented to person, place, and time.   No gross motor or sensory deficits   Skin: Skin is warm. She is not diaphoretic.   Psychiatric: She has a normal mood and affect.   Nursing note and vitals reviewed.      Results for orders placed or performed in visit on 02/08/18   Urine Culture - Urine, Urine, Clean Catch   Result Value Ref Range    Urine Culture Final report     Result 1 Comment    Comprehensive Metabolic Panel   Result Value Ref Range    Glucose 86 74 - 98 mg/dL    BUN 23 (H) 7 - 20 mg/dL    Creatinine 1.10 0.60 - 1.30 mg/dL    eGFR Non African Am 50 (L) >60 mL/min/1.73    eGFR African Am 60 (L) >60 mL/min/1.73    BUN/Creatinine Ratio 20.9 7.1 - 23.5    Sodium 141 137 - 145 mmol/L    Potassium 5.2 (H) 3.5 - 5.1 mmol/L    Chloride 99 98 - 107 mmol/L    Total CO2 29.0 26.0 - 30.0 mmol/L    Calcium 10.3 (H) 8.4 - 10.2 mg/dL    Total Protein 7.6 6.3 - 8.2 g/dL    Albumin 4.70 3.50 - 5.00 g/dL    Globulin 2.9 gm/dL    A/G Ratio 1.6 1.0 - 2.0 g/dL    Total Bilirubin 0.5 0.2 - 1.3 mg/dL    Alkaline Phosphatase 54 38 - 126 U/L    AST (SGOT) 28 15 - 46 U/L    ALT (SGPT) 30 13 - 69 U/L   POCT urinalysis dipstick, automated   Result Value Ref Range    Color Yellow Yellow, Straw, Dark Yellow, Elaine     Clarity, UA Clear Clear    Glucose, UA Negative Negative, 1000 mg/dL (3+) mg/dL    Bilirubin Negative Negative    Ketones, UA Negative Negative    Specific Gravity  1.015 1.005 - 1.030    Blood, UA Negative Negative    pH, Urine 6.0 5.0 - 8.0    Protein, POC Negative Negative mg/dL    Urobilinogen, UA Normal Normal    Leukocytes Negative Negative    Nitrite, UA Negative Negative   CBC & Differential   Result Value Ref Range    WBC 10.57 4.80 - 10.80 10*3/mm3    RBC 3.90 (L) 4.20 - 5.40 10*6/mm3    Hemoglobin 12.2 12.0 - 16.0 g/dL    Hematocrit 37.5 37.0 - 47.0 %    MCV 96.2 81.0 - 99.0 fL    MCH 31.3 (H) 27.0 - 31.0 pg    MCHC 32.5 30.0 - 37.0 g/dL    RDW 13.5 11.5 - 14.5 %    Platelets 321 130 - 400 10*3/mm3    Neutrophil Rel % 45.5 37.0 - 80.0 %    Lymphocyte Rel % 40.4 10.0 - 50.0 %    Monocyte Rel % 8.2 0.0 - 12.0 %    Eosinophil Rel % 4.9 0.0 - 7.0 %    Basophil Rel % 0.7 0.0 - 2.5 %    Neutrophils Absolute 4.81 2.00 - 6.90 10*3/mm3    Lymphocytes Absolute 4.27 (H) 0.60 - 3.40 10*3/mm3    Monocytes Absolute 0.87 0.00 - 0.90 10*3/mm3    Eosinophils Absolute 0.52 0.00 - 0.70 10*3/mm3    Basophils Absolute 0.07 0.00 - 0.20 10*3/mm3    Immature Granulocyte Rel % 0.3 0.0 - 0.6 %    Immature Grans Absolute 0.03 0.00 - 0.06 10*3/mm3    nRBC 0.0 0.0 - 0.0 /100 WBC         Assessment/Plan     ICD-10-CM ICD-9-CM   1. Benign essential hypertension I10 401.1   2. Mixed hyperlipidemia E78.2 272.2   3. Controlled type 2 diabetes mellitus without complication, without long-term current use of insulin (CMS/[facility]) E11.9 250.00   4. Constipation, unspecified constipation type K59.00 564.00   5. Need for immunization against influenza Z23 V04.81   6. Encounter for screening mammogram for malignant neoplasm of breast Z12.31 V76.12     Orders Placed This Encounter   Procedures   • Mammo Screening Digital Tomosynthesis Bilateral With CAD   • Flu Vaccine High Dose PF 65YR+ ([id number])   • Comprehensive Metabolic Panel   • Lipid Panel    • Hemoglobin A1c   • CBC & Differential       Plan:  1.  Benign essential hypertension: Will continue current medication, low-sodium diet advised  2.mixed hyperlipidemia: will obtain   fasting CMP and lipid panel.  Diet and excise counseled,  Will continue current medications  3. Diabetes  Mellitus  : will obtain   fasting CMP  and hba1c  , diet and exercise counseled , Will continue current medications  4.  Constipation: will labs and continue linzess, she doesn't want to see GI , may be drug induced- percoet 10 mg 4 times a day  5. screening of breast cancer: schedule mammogram  6. Need for flu vaccine : given today       Genny Delgado MD

## 2019-01-07 ENCOUNTER — TELEPHONE (OUTPATIENT)
Dept: INTERNAL MEDICINE | Facility: CLINIC | Age: 69
End: 2019-01-07

## 2019-01-07 DIAGNOSIS — G89.4 CHRONIC PAIN SYNDROME: Primary | ICD-10-CM

## 2019-01-07 NOTE — TELEPHONE ENCOUNTER
Patient left voicemail stating that she needs a referral to a pain clinic because her back surgeon does not prescribe pain medication anymore. She would like a referral to [address] Phone: [phone] Fax: [phone], Dr. Pito Howard. Spoke to patient and informed that I would inform Dr. Delgado.

## 2019-01-16 NOTE — TELEPHONE ENCOUNTER
Please order pain clinic referral to Dr. Pritesh Hu in [place], patient states that he is accepting new patients and is really close to her house. Patient would also like to know if you can give her a few Gabapentin to do her until she can get them. She states that she has been out and is in bad pain. Informed I would speak to dr maciel and call her back tomorrow.

## 2019-01-17 NOTE — TELEPHONE ENCOUNTER
Dr vo is FP doctor , if she plans to see him , she doesn't need a referral , she just needs to change pcp !   please dont do a referral to him

## 2019-01-17 NOTE — TELEPHONE ENCOUNTER
Patient has called to insist a referral be sent in asap since she is in so much pain and has been with out pain meds.    Since Dr. Thomason no longer handling pain management then she would like to go ahead to see Dr. Pito Howard at [facility].   She was informed that Dr. Asencio and Karis are in clinic and they will get back to her as soon as they can.

## 2019-01-17 NOTE — TELEPHONE ENCOUNTER
Patient returned your.   call [phone]  She would like call back today please. Thank you .

## 2019-01-17 NOTE — TELEPHONE ENCOUNTER
I called and left a message at Dr. Pritesh Hu's office this morning.  All information that I was able to locate for Dr. Hu indicates that he is a Family Medicine Doctor, so I am attempting to confirm whether he does Pain Management, and then to try and get her scheduled if possible.

## 2019-01-24 ENCOUNTER — TELEPHONE (OUTPATIENT)
Dept: INTERNAL MEDICINE | Facility: CLINIC | Age: 69
End: 2019-01-24

## 2019-01-30 ENCOUNTER — TELEPHONE (OUTPATIENT)
Dept: INTERNAL MEDICINE | Facility: CLINIC | Age: 69
End: 2019-01-30

## 2019-01-30 NOTE — TELEPHONE ENCOUNTER
Patient cannot get into Pain Clinic until March 7th and is requesting a refill on Gabapentin.  Patient can't sleep at night.    Please call to advise.

## 2019-02-14 ENCOUNTER — TELEPHONE (OUTPATIENT)
Dept: INTERNAL MEDICINE | Facility: CLINIC | Age: 69
End: 2019-02-14

## 2019-02-14 NOTE — TELEPHONE ENCOUNTER
Pt had left a VM at 8:27AM stating that she was needing medication sent in for poor circulation because her hands and feet were staying ice cold. I attempted to contact patient back, no answer, so I left a VM informing pt to call the office and schedule an appointment with Dr. Delgado for this issue.

## 2019-03-04 ENCOUNTER — TELEPHONE (OUTPATIENT)
Dept: INTERNAL MEDICINE | Facility: CLINIC | Age: 69
End: 2019-03-04

## 2019-03-04 DIAGNOSIS — N76.1 SUBACUTE VAGINITIS: Primary | ICD-10-CM

## 2019-03-04 NOTE — TELEPHONE ENCOUNTER
Called stating that she is having green discharge from vagina, states that she has douched and that seemed to help, wants to know what she needs to do, states that she can not be seen  [facility]

## 2019-03-11 ENCOUNTER — TELEPHONE (OUTPATIENT)
Dept: INTERNAL MEDICINE | Facility: CLINIC | Age: 69
End: 2019-03-11

## 2019-03-12 NOTE — TELEPHONE ENCOUNTER
Spoke with referral clerks, they advised me that pt needs to call to reschedule appointment   [phone]  Left pt a detailed message of this information

## 2019-03-28 ENCOUNTER — TELEPHONE (OUTPATIENT)
Dept: INTERNAL MEDICINE | Facility: CLINIC | Age: 69
End: 2019-03-28

## 2019-05-22 RX ORDER — METOPROLOL TARTRATE 50 MG/1
TABLET, FILM COATED ORAL
Qty: 60 TABLET | Refills: 5 | Status: SHIPPED | OUTPATIENT
Start: 2019-05-22 | End: 2019-11-18 | Stop reason: SDUPTHER

## 2019-06-03 RX ORDER — LISINOPRIL 40 MG/1
40 TABLET ORAL DAILY
Qty: 30 TABLET | Refills: 6 | OUTPATIENT
Start: 2019-06-03

## 2019-06-03 RX ORDER — METOPROLOL TARTRATE 50 MG/1
50 TABLET, FILM COATED ORAL 2 TIMES DAILY
Qty: 60 TABLET | Refills: 5 | OUTPATIENT
Start: 2019-06-03

## 2019-06-04 ENCOUNTER — TELEPHONE (OUTPATIENT)
Dept: INTERNAL MEDICINE | Facility: CLINIC | Age: 69
End: 2019-06-04

## 2019-06-04 NOTE — TELEPHONE ENCOUNTER
Pt called stating that she needs to have a tooth pulled that her dentist Dr. Robbins will not pull said tooth due her health issues (diabetes) pt will need clearance  I spoke with Karis at [facility] office and she stated that Dr. Robbins is concerned with healing  I advised Karis that Dr. CARLYLE Canales is out of the office until next week, Karis said that if another provider would be willing to look at the pt recent labs A1c and give clearance in writing that would be acceptable pt is in pain with this tooth  Please advise

## 2019-06-18 ENCOUNTER — OFFICE VISIT (OUTPATIENT)
Dept: INTERNAL MEDICINE | Facility: CLINIC | Age: 69
End: 2019-06-18

## 2019-06-18 VITALS
WEIGHT: 112 LBS | HEIGHT: 58 IN | DIASTOLIC BLOOD PRESSURE: 78 MMHG | SYSTOLIC BLOOD PRESSURE: 130 MMHG | RESPIRATION RATE: 16 BRPM | BODY MASS INDEX: 23.51 KG/M2 | HEART RATE: 59 BPM | OXYGEN SATURATION: 98 % | TEMPERATURE: 97.8 F

## 2019-06-18 DIAGNOSIS — E78.2 MIXED HYPERLIPIDEMIA: ICD-10-CM

## 2019-06-18 DIAGNOSIS — R60.0 LOCALIZED EDEMA: ICD-10-CM

## 2019-06-18 DIAGNOSIS — E11.9 CONTROLLED TYPE 2 DIABETES MELLITUS WITHOUT COMPLICATION, WITHOUT LONG-TERM CURRENT USE OF INSULIN (HCC): ICD-10-CM

## 2019-06-18 DIAGNOSIS — I10 BENIGN ESSENTIAL HYPERTENSION: Primary | ICD-10-CM

## 2019-06-18 LAB
ALBUMIN SERPL-MCNC: 4.6 G/DL (ref 3.5–5)
ALBUMIN/GLOB SERPL: 1.5 G/DL (ref 1–2)
ALP SERPL-CCNC: 76 U/L (ref 38–126)
ALT SERPL-CCNC: 24 U/L (ref 13–69)
AST SERPL-CCNC: 34 U/L (ref 15–46)
BASOPHILS # BLD AUTO: 0.1 10*3/MM3 (ref 0–0.2)
BASOPHILS NFR BLD AUTO: 0.9 % (ref 0–1.5)
BILIRUB SERPL-MCNC: 0.4 MG/DL (ref 0.2–1.3)
BUN SERPL-MCNC: 20 MG/DL (ref 7–20)
BUN/CREAT SERPL: 20 (ref 7.1–23.5)
CALCIUM SERPL-MCNC: 9.7 MG/DL (ref 8.4–10.2)
CHLORIDE SERPL-SCNC: 98 MMOL/L (ref 98–107)
CHOLEST SERPL-MCNC: 131 MG/DL (ref 0–199)
CO2 SERPL-SCNC: 27 MMOL/L (ref 26–30)
CREAT SERPL-MCNC: 1 MG/DL (ref 0.6–1.3)
EOSINOPHIL # BLD AUTO: 0.46 10*3/MM3 (ref 0–0.4)
EOSINOPHIL NFR BLD AUTO: 4.2 % (ref 0.3–6.2)
ERYTHROCYTE [DISTWIDTH] IN BLOOD BY AUTOMATED COUNT: 14 % (ref 12.3–15.4)
GLOBULIN SER CALC-MCNC: 3 GM/DL
GLUCOSE SERPL-MCNC: 81 MG/DL (ref 74–98)
HBA1C MFR BLD: 5.9 % (ref 4.8–5.6)
HCT VFR BLD AUTO: 38.2 % (ref 34–46.6)
HDLC SERPL-MCNC: 58 MG/DL (ref 40–60)
HGB BLD-MCNC: 12.4 G/DL (ref 12–15.9)
IMM GRANULOCYTES # BLD AUTO: 0.04 10*3/MM3 (ref 0–0.05)
IMM GRANULOCYTES NFR BLD AUTO: 0.4 % (ref 0–0.5)
LDLC SERPL CALC-MCNC: 52 MG/DL (ref 0–99)
LYMPHOCYTES # BLD AUTO: 3.47 10*3/MM3 (ref 0.7–3.1)
LYMPHOCYTES NFR BLD AUTO: 31.4 % (ref 19.6–45.3)
MCH RBC QN AUTO: 31.2 PG (ref 26.6–33)
MCHC RBC AUTO-ENTMCNC: 32.5 G/DL (ref 31.5–35.7)
MCV RBC AUTO: 96 FL (ref 79–97)
MONOCYTES # BLD AUTO: 0.87 10*3/MM3 (ref 0.1–0.9)
MONOCYTES NFR BLD AUTO: 7.9 % (ref 5–12)
NEUTROPHILS # BLD AUTO: 6.12 10*3/MM3 (ref 1.7–7)
NEUTROPHILS NFR BLD AUTO: 55.2 % (ref 42.7–76)
NRBC BLD AUTO-RTO: 0 /100 WBC (ref 0–0.2)
PLATELET # BLD AUTO: 329 10*3/MM3 (ref 140–450)
POTASSIUM SERPL-SCNC: 4.3 MMOL/L (ref 3.5–5.1)
PROT SERPL-MCNC: 7.6 G/DL (ref 6.3–8.2)
RBC # BLD AUTO: 3.98 10*6/MM3 (ref 3.77–5.28)
SODIUM SERPL-SCNC: 137 MMOL/L (ref 137–145)
TRIGL SERPL-MCNC: 105 MG/DL
VLDLC SERPL CALC-MCNC: 21 MG/DL
WBC # BLD AUTO: 11.06 10*3/MM3 (ref 3.4–10.8)

## 2019-06-18 PROCEDURE — 99214 OFFICE O/P EST MOD 30 MIN: CPT | Performed by: INTERNAL MEDICINE

## 2019-06-18 RX ORDER — HYDROCHLOROTHIAZIDE 12.5 MG/1
12.5 CAPSULE, GELATIN COATED ORAL DAILY PRN
Qty: 30 CAPSULE | Refills: 5 | Status: SHIPPED | OUTPATIENT
Start: 2019-06-18 | End: 2019-12-08 | Stop reason: SDUPTHER

## 2019-06-18 RX ORDER — OXYCODONE HYDROCHLORIDE AND ACETAMINOPHEN 5; 325 MG/1; MG/1
1 TABLET ORAL EVERY 8 HOURS
Refills: 0 | COMMUNITY
Start: 2019-05-24

## 2019-06-18 NOTE — PROGRESS NOTES
Subjective   Glendy Garcia is a 68 y.o. female.     Chief Complaint   Patient presents with   • Diabetes   • Hypertension   • Hyperlipidemia   • Leg Swelling       History of Present Illness   HPI: Patient is here to follow up on the blood pressure  The patient is taking the blood pressure medications as prescribed and has had no side effects. The patient is also here to follow up on the cholesterol and is trying to follow a diet. The patient is also here to follow on sugar and  due to get lab work done . The patient also needs refills on medications .  She complains of leg swelling  Hypertension   Pertinent negatives include no chest pain, palpitations or shortness of breath.   Hyperlipidemia   Pertinent negatives include no chest pain or shortness of breath.   Diabetes   Pertinent negatives for hypoglycemia include no dizziness, nervousness/anxiousness or pallor. Pertinent negatives for diabetes include no chest pain, no shortness of breath  Patient is on acei/arb   The following portions of the patient's history were reviewed and updated as appropriate: allergies, current medications, past family history, past medical history, past social history, past surgical history and problem list.    Review of Systems   Constitutional: Negative for appetite change, fatigue and fever.   HENT: Negative for congestion, ear discharge, ear pain, sinus pressure and sore throat.    Eyes: Negative for pain and discharge.   Respiratory: Negative for cough, chest tightness, shortness of breath and wheezing.    Cardiovascular: Positive for leg swelling. Negative for chest pain and palpitations.   Gastrointestinal: Negative for abdominal pain, blood in stool, constipation, diarrhea and nausea.   Endocrine: Negative for cold intolerance and heat intolerance.   Genitourinary: Negative for dysuria, flank pain and frequency.   Musculoskeletal: Negative for back pain and joint swelling.   Skin: Negative for color change.  "  Allergic/Immunologic: Negative for environmental allergies and food allergies.   Neurological: Negative for dizziness, weakness, numbness and headaches.   Hematological: Negative for adenopathy. Does not bruise/bleed easily.   Psychiatric/Behavioral: Negative for behavioral problems and dysphoric mood. The patient is not nervous/anxious.          Current Outpatient Medications:   •  albuterol (PROVENTIL HFA) 108 (90 BASE) MCG/ACT inhaler, Inhale 1 puff Every 6 (Six) Hours As Needed for wheezing., Disp: 1 inhaler, Rfl: 11  •  aspirin 325 MG tablet, Take 1 tablet by mouth., Disp: , Rfl:   •  Lancets (FREESTYLE) lancets, , Disp: , Rfl:   •  linaclotide (LINZESS) 145 MCG capsule capsule, Take 1 capsule by mouth Daily., Disp: 30 capsule, Rfl: 3  •  lisinopril (PRINIVIL,ZESTRIL) 40 MG tablet, Take 1 tablet by mouth Daily., Disp: 30 tablet, Rfl: 6  •  metFORMIN (GLUCOPHAGE) 500 MG tablet, take 1 tablet by mouth once daily, Disp: 30 tablet, Rfl: 5  •  metoprolol tartrate (LOPRESSOR) 50 MG tablet, take 1 tablet by mouth twice a day, Disp: 60 tablet, Rfl: 5  •  Omega-3 Fatty Acids (OMEGA 3 PO), Take 2 capsules by mouth 3 (Three) Times a Day., Disp: , Rfl:   •  oxyCODONE-acetaminophen (PERCOCET) 5-325 MG per tablet, Take 1 tablet by mouth Every 8 (Eight) Hours., Disp: , Rfl: 0  •  rosuvastatin (CRESTOR) 40 MG tablet, Take 1 tablet by mouth Every Night., Disp: 30 tablet, Rfl: 6  •  tiotropium (SPIRIVA HANDIHALER) 18 MCG per inhalation capsule, Spiriva HandiHaler 18 MCG Inhalation Capsule; Patient Sig: Spiriva HandiHaler 18 MCG Inhalation Capsule USE AS DIRECTED.; 0; 28-Jan-2014; Active, Disp: , Rfl:   •  hydrochlorothiazide (MICROZIDE) 12.5 MG capsule, Take 1 capsule by mouth Daily As Needed (edema)., Disp: 30 capsule, Rfl: 5    Objective     Blood pressure 130/78, pulse 59, temperature 97.8 °F (36.6 °C), resp. rate 16, height 147.3 cm (58\"), weight 50.8 kg (112 lb), SpO2 98 %, not currently breastfeeding.    Physical Exam "   Constitutional: She is oriented to person, place, and time. She appears well-developed and well-nourished. No distress.   HENT:   Head: Normocephalic and atraumatic.   Right Ear: External ear normal.   Left Ear: External ear normal.   Nose: Nose normal.   Mouth/Throat: Oropharynx is clear and moist.   Eyes: Conjunctivae and EOM are normal. Pupils are equal, round, and reactive to light.   Neck: Normal range of motion. Neck supple. No thyromegaly present.   Cardiovascular: Normal rate, regular rhythm and normal heart sounds.   Pulmonary/Chest: Effort normal and breath sounds normal. No respiratory distress. She has no wheezes.   Abdominal: Soft. Bowel sounds are normal. She exhibits no distension. There is no tenderness. There is no guarding.   Musculoskeletal: She exhibits edema.   Walks with walker    Lymphadenopathy:     She has no cervical adenopathy.   Neurological: She is alert and oriented to person, place, and time.   No gross  motor or sensory deficits   Skin: Skin is warm. She is not diaphoretic. No erythema.   Psychiatric: She has a normal mood and affect.   Nursing note and vitals reviewed.    Patient's Body mass index is 23.41 kg/m².        Results for orders placed or performed in visit on 06/18/19   Comprehensive Metabolic Panel   Result Value Ref Range    Glucose 81 74 - 98 mg/dL    BUN 20 7 - 20 mg/dL    Creatinine 1.00 0.60 - 1.30 mg/dL    eGFR Non African Am 55 (L) >60 mL/min/1.73    eGFR African Am 67 >60 mL/min/1.73    BUN/Creatinine Ratio 20.0 7.1 - 23.5    Sodium 137 137 - 145 mmol/L    Potassium 4.3 3.5 - 5.1 mmol/L    Chloride 98 98 - 107 mmol/L    Total CO2 27.0 26.0 - 30.0 mmol/L    Calcium 9.7 8.4 - 10.2 mg/dL    Total Protein 7.6 6.3 - 8.2 g/dL    Albumin 4.60 3.50 - 5.00 g/dL    Globulin 3.0 gm/dL    A/G Ratio 1.5 1.0 - 2.0 g/dL    Total Bilirubin 0.4 0.2 - 1.3 mg/dL    Alkaline Phosphatase 76 38 - 126 U/L    AST (SGOT) 34 15 - 46 U/L    ALT (SGPT) 24 13 - 69 U/L   Lipid Panel   Result  Value Ref Range    Total Cholesterol 131 0 - 199 mg/dL    Triglycerides 105 <150 mg/dL    HDL Cholesterol 58 40 - 60 mg/dL    VLDL Cholesterol 21 mg/dL    LDL Cholesterol  52 0 - 99 mg/dL   Hemoglobin A1c   Result Value Ref Range    Hemoglobin A1C 5.90 (H) 4.80 - 5.60 %   CBC & Differential   Result Value Ref Range    WBC 11.06 (H) 3.40 - 10.80 10*3/mm3    RBC 3.98 3.77 - 5.28 10*6/mm3    Hemoglobin 12.4 12.0 - 15.9 g/dL    Hematocrit 38.2 34.0 - 46.6 %    MCV 96.0 79.0 - 97.0 fL    MCH 31.2 26.6 - 33.0 pg    MCHC 32.5 31.5 - 35.7 g/dL    RDW 14.0 12.3 - 15.4 %    Platelets 329 140 - 450 10*3/mm3    Neutrophil Rel % 55.2 42.7 - 76.0 %    Lymphocyte Rel % 31.4 19.6 - 45.3 %    Monocyte Rel % 7.9 5.0 - 12.0 %    Eosinophil Rel % 4.2 0.3 - 6.2 %    Basophil Rel % 0.9 0.0 - 1.5 %    Neutrophils Absolute 6.12 1.70 - 7.00 10*3/mm3    Lymphocytes Absolute 3.47 (H) 0.70 - 3.10 10*3/mm3    Monocytes Absolute 0.87 0.10 - 0.90 10*3/mm3    Eosinophils Absolute 0.46 (H) 0.00 - 0.40 10*3/mm3    Basophils Absolute 0.10 0.00 - 0.20 10*3/mm3    Immature Granulocyte Rel % 0.4 0.0 - 0.5 %    Immature Grans Absolute 0.04 0.00 - 0.05 10*3/mm3    nRBC 0.0 0.0 - 0.2 /100 WBC         Assessment/Plan   Glendy was seen today for diabetes, hypertension, hyperlipidemia and leg swelling.    Diagnoses and all orders for this visit:    Benign essential hypertension    Mixed hyperlipidemia  -     CBC & Differential  -     Comprehensive Metabolic Panel  -     Lipid Panel    Controlled type 2 diabetes mellitus without complication, without long-term current use of insulin (CMS/[facility])  -     Hemoglobin A1c    Localized edema    Other orders  -     hydrochlorothiazide (MICROZIDE) 12.5 MG capsule; Take 1 capsule by mouth Daily As Needed (edema).      Plan:  1.  Benign essential hypertension: Will continue current medication, low-sodium diet advised  2.mixed hyperlipidemia: will obtain   fasting CMP and lipid panel.  Diet and exercise counseled,  Will  continue current medications  3. Diabetes  Mellitus  : will obtain   fasting CMP  and hba1c  , diet and exercise counseled , Will continue current medications  4. Edema : prn hctz , patient advised to follow-up with cardiology         Genny Delgado MD

## 2019-06-28 RX ORDER — LISINOPRIL 40 MG/1
TABLET ORAL
Qty: 30 TABLET | Refills: 6 | Status: SHIPPED | OUTPATIENT
Start: 2019-06-28 | End: 2020-01-21

## 2019-07-16 ENCOUNTER — TELEPHONE (OUTPATIENT)
Dept: INTERNAL MEDICINE | Facility: CLINIC | Age: 69
End: 2019-07-16

## 2019-07-16 NOTE — TELEPHONE ENCOUNTER
Patient called because she would like Anita or Dr. Delgado to reach out to her pharmacy ([facility] in [place]) to see which of the nicotine patches might be covered by her insurance. Patient states that the pharmacy would know the best options.    This would allow Dr. Delgado to then prescribe the covered patches to that pharmacy.    Please advise.

## 2019-07-17 NOTE — TELEPHONE ENCOUNTER
I'm almost certain the pharmacy checks with insurance as to what is covered  Do you want to write something?

## 2019-07-18 RX ORDER — NICOTINE 21 MG/24HR
1 PATCH, TRANSDERMAL 24 HOURS TRANSDERMAL EVERY 24 HOURS
Qty: 21 PATCH | Refills: 0 | Status: SHIPPED | OUTPATIENT
Start: 2019-07-18 | End: 2020-06-25

## 2019-08-20 ENCOUNTER — TELEPHONE (OUTPATIENT)
Dept: INTERNAL MEDICINE | Facility: CLINIC | Age: 69
End: 2019-08-20

## 2019-10-16 RX ORDER — ROSUVASTATIN CALCIUM 40 MG/1
40 TABLET, COATED ORAL EVERY EVENING
Qty: 30 TABLET | Refills: 0 | Status: SHIPPED | OUTPATIENT
Start: 2019-10-16 | End: 2020-05-28 | Stop reason: SDUPTHER

## 2019-11-11 RX ORDER — METOPROLOL TARTRATE 50 MG/1
TABLET, FILM COATED ORAL
Qty: 60 TABLET | Refills: 5 | OUTPATIENT
Start: 2019-11-11

## 2019-11-19 RX ORDER — METOPROLOL TARTRATE 50 MG/1
TABLET, FILM COATED ORAL
Qty: 60 TABLET | Refills: 3 | Status: SHIPPED | OUTPATIENT
Start: 2019-11-19 | End: 2020-02-24

## 2019-12-10 RX ORDER — HYDROCHLOROTHIAZIDE 12.5 MG/1
CAPSULE, GELATIN COATED ORAL
Qty: 90 CAPSULE | Refills: 0 | Status: SHIPPED | OUTPATIENT
Start: 2019-12-10 | End: 2020-06-25 | Stop reason: SDUPTHER

## 2020-01-21 RX ORDER — LISINOPRIL 40 MG/1
TABLET ORAL
Qty: 30 TABLET | Refills: 1 | Status: SHIPPED | OUTPATIENT
Start: 2020-01-21 | End: 2020-03-19

## 2020-02-24 RX ORDER — METOPROLOL TARTRATE 50 MG/1
TABLET, FILM COATED ORAL
Qty: 60 TABLET | Refills: 2 | Status: SHIPPED | OUTPATIENT
Start: 2020-02-24 | End: 2020-05-28 | Stop reason: SDUPTHER

## 2020-03-19 RX ORDER — LISINOPRIL 40 MG/1
TABLET ORAL
Qty: 30 TABLET | Refills: 0 | Status: SHIPPED | OUTPATIENT
Start: 2020-03-19 | End: 2020-03-26 | Stop reason: SDUPTHER

## 2020-03-26 ENCOUNTER — TELEPHONE (OUTPATIENT)
Dept: INTERNAL MEDICINE | Facility: CLINIC | Age: 70
End: 2020-03-26

## 2020-03-26 RX ORDER — LISINOPRIL 40 MG/1
40 TABLET ORAL DAILY
Qty: 30 TABLET | Refills: 1 | Status: SHIPPED | OUTPATIENT
Start: 2020-03-26 | End: 2020-05-28 | Stop reason: SDUPTHER

## 2020-03-26 NOTE — TELEPHONE ENCOUNTER
"PATIENT CALLED STATING SHES BEEN TESTED FOR COVID BUT POLLY HAVE RESULTS TIL THIS WEEKEND AND WOULD LIKE TO REQUEST SOMETHING TO ASSIST WITH HER BREATHING , SHE STATES \" ITS A UTENSIL U CAN CARRY AROUND AND IT WILL ATTACH TO NOSTRILS AND SUPPLYS OXYGEN: PATIENT DID NOT KNOW NAME OF EQUIPMENT BUT STATES SHES GETTING BAD HEADACHES AND BELIEVES OXYGEN IS NOT GETTING TO HER BRAIN.     DWIGHT [facility]  [phone]        "

## 2020-03-26 NOTE — TELEPHONE ENCOUNTER
PT CALLED TO REQUEST A REFILL FOR lisinopril (PRINIVIL,ZESTRIL) 40 MG tablet.    PT CONTACT [phone]    PHARMACY VERIFIED DWIGHT

## 2020-03-27 ENCOUNTER — TELEPHONE (OUTPATIENT)
Dept: INTERNAL MEDICINE | Facility: CLINIC | Age: 70
End: 2020-03-27

## 2020-03-27 DIAGNOSIS — J43.2 CENTRILOBULAR EMPHYSEMA: Primary | ICD-10-CM

## 2020-03-27 NOTE — TELEPHONE ENCOUNTER
PT CALLED AND STATED THAT SHE WOULD LIKE AN ORDER FOR PORTABLE INOGEN PORTABLE OXYGEN OR MARRERO PORTABLE OXYGEN   -  PT STATES THAT SHE IS GETTING HEADACHES AND IS WORRIED ABOUT HER OXYGEN    [place] [address]   [facility] [id number] [phone]    PT CALL BACK [phone]

## 2020-05-26 RX ORDER — METOPROLOL TARTRATE 50 MG/1
TABLET, FILM COATED ORAL
Qty: 60 TABLET | Refills: 2 | OUTPATIENT
Start: 2020-05-26

## 2020-05-28 RX ORDER — LISINOPRIL 40 MG/1
40 TABLET ORAL DAILY
Qty: 30 TABLET | Refills: 0 | Status: SHIPPED | OUTPATIENT
Start: 2020-05-28 | End: 2020-06-25 | Stop reason: SDUPTHER

## 2020-05-28 RX ORDER — METOPROLOL TARTRATE 50 MG/1
50 TABLET, FILM COATED ORAL 2 TIMES DAILY
Qty: 60 TABLET | Refills: 0 | Status: SHIPPED | OUTPATIENT
Start: 2020-05-28 | End: 2020-06-25 | Stop reason: SDUPTHER

## 2020-05-28 RX ORDER — ROSUVASTATIN CALCIUM 40 MG/1
40 TABLET, COATED ORAL EVERY EVENING
Qty: 30 TABLET | Refills: 0 | Status: SHIPPED | OUTPATIENT
Start: 2020-05-28 | End: 2020-06-25 | Stop reason: SDUPTHER

## 2020-06-25 ENCOUNTER — OFFICE VISIT (OUTPATIENT)
Dept: INTERNAL MEDICINE | Facility: CLINIC | Age: 70
End: 2020-06-25

## 2020-06-25 VITALS
HEART RATE: 63 BPM | BODY MASS INDEX: 24.35 KG/M2 | DIASTOLIC BLOOD PRESSURE: 81 MMHG | OXYGEN SATURATION: 99 % | TEMPERATURE: 97.5 F | WEIGHT: 116 LBS | SYSTOLIC BLOOD PRESSURE: 145 MMHG | RESPIRATION RATE: 16 BRPM | HEIGHT: 58 IN

## 2020-06-25 DIAGNOSIS — I10 BENIGN ESSENTIAL HYPERTENSION: Primary | ICD-10-CM

## 2020-06-25 DIAGNOSIS — R07.9 CHEST PAIN, UNSPECIFIED TYPE: ICD-10-CM

## 2020-06-25 DIAGNOSIS — I73.9 PVD (PERIPHERAL VASCULAR DISEASE) (HCC): ICD-10-CM

## 2020-06-25 DIAGNOSIS — E78.2 MIXED HYPERLIPIDEMIA: ICD-10-CM

## 2020-06-25 DIAGNOSIS — E11.9 CONTROLLED TYPE 2 DIABETES MELLITUS WITHOUT COMPLICATION, WITHOUT LONG-TERM CURRENT USE OF INSULIN (HCC): ICD-10-CM

## 2020-06-25 DIAGNOSIS — I83.899 VARICOSE VEINS OF LOWER EXTREMITY WITH EDEMA, UNSPECIFIED LATERALITY: ICD-10-CM

## 2020-06-25 PROCEDURE — 99214 OFFICE O/P EST MOD 30 MIN: CPT | Performed by: INTERNAL MEDICINE

## 2020-06-25 RX ORDER — ROSUVASTATIN CALCIUM 20 MG/1
20 TABLET, COATED ORAL NIGHTLY
Qty: 30 TABLET | Refills: 4 | Status: SHIPPED | OUTPATIENT
Start: 2020-06-25 | End: 2020-12-17 | Stop reason: SDUPTHER

## 2020-06-25 RX ORDER — LISINOPRIL 40 MG/1
40 TABLET ORAL DAILY
Qty: 30 TABLET | Refills: 4 | Status: SHIPPED | OUTPATIENT
Start: 2020-06-25 | End: 2020-07-13

## 2020-06-25 RX ORDER — HYDROCHLOROTHIAZIDE 12.5 MG/1
12.5 CAPSULE, GELATIN COATED ORAL DAILY
Qty: 30 CAPSULE | Refills: 4 | Status: SHIPPED | OUTPATIENT
Start: 2020-06-25 | End: 2020-12-01 | Stop reason: SDUPTHER

## 2020-06-25 RX ORDER — METOPROLOL TARTRATE 50 MG/1
50 TABLET, FILM COATED ORAL 2 TIMES DAILY
Qty: 60 TABLET | Refills: 4 | Status: SHIPPED | OUTPATIENT
Start: 2020-06-25 | End: 2020-12-01 | Stop reason: SDUPTHER

## 2020-06-25 RX ORDER — ALBUTEROL SULFATE 90 UG/1
2 AEROSOL, METERED RESPIRATORY (INHALATION) EVERY 4 HOURS PRN
Qty: 1 INHALER | Refills: 2 | Status: SHIPPED | OUTPATIENT
Start: 2020-06-25 | End: 2021-05-12 | Stop reason: SDUPTHER

## 2020-06-25 NOTE — PROGRESS NOTES
Subjective   Glendy Garcia is a 69 y.o. female.     Chief Complaint   Patient presents with   • Hypertension     fasting   • Hyperlipidemia   • Diabetes   • Chest Pain   • Leg Swelling       History of Present Illness   HPI: Patient is here to follow up on the blood pressure  The patient is taking the blood pressure medications as prescribed and has had no side effects. The patient is also here to follow up on the cholesterol and sugar and is trying to follow a diet. The patient is   due to get lab work done .  The patient also needs refills on medications .  She is a current smoker, she states she has not seen her cardiologist for the past 3 to 4 years, she complains of intermittent chest pain with leg swelling, she has a history of PVD  Hyperlipidemia   Pertinent negatives include no chest pain or shortness of breath.   Hypertension   Pertinent negatives include no chest pain, palpitations or shortness of breath.    The following portions of the patient's history were reviewed and updated as appropriate: allergies, current medications, past family history, past medical history, past social history, past surgical history and problem list.    Review of Systems   Constitutional: Negative for appetite change, fatigue and fever.   HENT: Negative for congestion, ear discharge, ear pain, sinus pressure and sore throat.    Eyes: Negative for pain and discharge.   Respiratory: Negative for cough, chest tightness, shortness of breath and wheezing.    Cardiovascular: Positive for chest pain and leg swelling. Negative for palpitations.   Gastrointestinal: Negative for abdominal pain, blood in stool, constipation, diarrhea and nausea.   Endocrine: Negative for cold intolerance and heat intolerance.   Genitourinary: Negative for dysuria, flank pain and frequency.   Musculoskeletal: Negative for back pain and joint swelling.   Skin: Negative for color change.   Allergic/Immunologic: Negative for environmental allergies and food  "allergies.   Neurological: Negative for dizziness, weakness, numbness and headaches.   Hematological: Negative for adenopathy. Does not bruise/bleed easily.   Psychiatric/Behavioral: Negative for behavioral problems and dysphoric mood. The patient is not nervous/anxious.          Current Outpatient Medications:   •  aspirin 325 MG tablet, Take 1 tablet by mouth., Disp: , Rfl:   •  hydroCHLOROthiazide (MICROZIDE) 12.5 MG capsule, Take 1 capsule by mouth Daily., Disp: 30 capsule, Rfl: 4  •  Lancets (FREESTYLE) lancets, , Disp: , Rfl:   •  linaclotide (LINZESS) 145 MCG capsule capsule, Take 1 capsule by mouth Daily., Disp: 30 capsule, Rfl: 3  •  lisinopril (PRINIVIL,ZESTRIL) 40 MG tablet, Take 1 tablet by mouth Daily., Disp: 30 tablet, Rfl: 4  •  metFORMIN (GLUCOPHAGE) 500 MG tablet, Take 1 tablet by mouth Daily., Disp: 30 tablet, Rfl: 4  •  metoprolol tartrate (LOPRESSOR) 50 MG tablet, Take 1 tablet by mouth 2 (Two) Times a Day., Disp: 60 tablet, Rfl: 4  •  Omega-3 Fatty Acids (OMEGA 3 PO), Take 2 capsules by mouth 3 (Three) Times a Day., Disp: , Rfl:   •  oxyCODONE-acetaminophen (PERCOCET) 5-325 MG per tablet, Take 1 tablet by mouth Every 8 (Eight) Hours., Disp: , Rfl: 0  •  rosuvastatin (CRESTOR) 20 MG tablet, Take 1 tablet by mouth Every Night., Disp: 30 tablet, Rfl: 4  •  tiotropium (SPIRIVA HANDIHALER) 18 MCG per inhalation capsule, Spiriva HandiHaler 18 MCG Inhalation Capsule; Patient Sig: Spiriva HandiHaler 18 MCG Inhalation Capsule USE AS DIRECTED.; 0; 28-Jan-2014; Active, Disp: , Rfl:   •  umeclidinium-vilanterol (ANORO ELLIPTA) 62.5-25 MCG/INH aerosol powder  inhaler, Inhale 1 puff Daily., Disp: 1 each, Rfl: 5  •  albuterol sulfate  (90 Base) MCG/ACT inhaler, Inhale 2 puffs Every 4 (Four) Hours As Needed for Wheezing., Disp: 1 inhaler, Rfl: 2    Objective     Blood pressure 145/81, pulse 63, temperature 97.5 °F (36.4 °C), resp. rate 16, height 147.3 cm (57.99\"), weight 52.6 kg (116 lb), SpO2 99 %, not " currently breastfeeding.    Physical Exam   Constitutional: She is oriented to person, place, and time. She appears well-developed and well-nourished. No distress.   HENT:   Head: Normocephalic and atraumatic.   Right Ear: External ear normal.   Left Ear: External ear normal.   Nose: Nose normal.   Mouth/Throat: Oropharynx is clear and moist.   Eyes: Pupils are equal, round, and reactive to light. Conjunctivae and EOM are normal.   Neck: Neck supple. No thyromegaly present.   Cardiovascular: Normal rate, regular rhythm and normal heart sounds.   Pulmonary/Chest: Effort normal and breath sounds normal. No respiratory distress.   Abdominal: Soft. Bowel sounds are normal. She exhibits no distension. There is no tenderness. There is no rebound.   Musculoskeletal: She exhibits edema.   walks with walker  Bilateral varicosities   Lymphadenopathy:     She has no cervical adenopathy.   Neurological: She is alert and oriented to person, place, and time.   No gross motor or sensory deficits   Skin: Skin is warm. She is not diaphoretic.   Psychiatric: She has a normal mood and affect.   Nursing note and vitals reviewed.    Patient's Body mass index is 24.25 kg/m². BMI is within normal parameters. No follow-up required..      Results for orders placed or performed in visit on 06/18/19   Comprehensive Metabolic Panel   Result Value Ref Range    Glucose 81 74 - 98 mg/dL    BUN 20 7 - 20 mg/dL    Creatinine 1.00 0.60 - 1.30 mg/dL    eGFR Non African Am 55 (L) >60 mL/min/1.73    eGFR African Am 67 >60 mL/min/1.73    BUN/Creatinine Ratio 20.0 7.1 - 23.5    Sodium 137 137 - 145 mmol/L    Potassium 4.3 3.5 - 5.1 mmol/L    Chloride 98 98 - 107 mmol/L    Total CO2 27.0 26.0 - 30.0 mmol/L    Calcium 9.7 8.4 - 10.2 mg/dL    Total Protein 7.6 6.3 - 8.2 g/dL    Albumin 4.60 3.50 - 5.00 g/dL    Globulin 3.0 gm/dL    A/G Ratio 1.5 1.0 - 2.0 g/dL    Total Bilirubin 0.4 0.2 - 1.3 mg/dL    Alkaline Phosphatase 76 38 - 126 U/L    AST (SGOT) 34 15  - 46 U/L    ALT (SGPT) 24 13 - 69 U/L   Lipid Panel   Result Value Ref Range    Total Cholesterol 131 0 - 199 mg/dL    Triglycerides 105 <150 mg/dL    HDL Cholesterol 58 40 - 60 mg/dL    VLDL Cholesterol 21 mg/dL    LDL Cholesterol  52 0 - 99 mg/dL   Hemoglobin A1c   Result Value Ref Range    Hemoglobin A1C 5.90 (H) 4.80 - 5.60 %   CBC & Differential   Result Value Ref Range    WBC 11.06 (H) 3.40 - 10.80 10*3/mm3    RBC 3.98 3.77 - 5.28 10*6/mm3    Hemoglobin 12.4 12.0 - 15.9 g/dL    Hematocrit 38.2 34.0 - 46.6 %    MCV 96.0 79.0 - 97.0 fL    MCH 31.2 26.6 - 33.0 pg    MCHC 32.5 31.5 - 35.7 g/dL    RDW 14.0 12.3 - 15.4 %    Platelets 329 140 - 450 10*3/mm3    Neutrophil Rel % 55.2 42.7 - 76.0 %    Lymphocyte Rel % 31.4 19.6 - 45.3 %    Monocyte Rel % 7.9 5.0 - 12.0 %    Eosinophil Rel % 4.2 0.3 - 6.2 %    Basophil Rel % 0.9 0.0 - 1.5 %    Neutrophils Absolute 6.12 1.70 - 7.00 10*3/mm3    Lymphocytes Absolute 3.47 (H) 0.70 - 3.10 10*3/mm3    Monocytes Absolute 0.87 0.10 - 0.90 10*3/mm3    Eosinophils Absolute 0.46 (H) 0.00 - 0.40 10*3/mm3    Basophils Absolute 0.10 0.00 - 0.20 10*3/mm3    Immature Granulocyte Rel % 0.4 0.0 - 0.5 %    Immature Grans Absolute 0.04 0.00 - 0.05 10*3/mm3    nRBC 0.0 0.0 - 0.2 /100 WBC         Assessment/Plan   Glendy was seen today for hypertension, hyperlipidemia, diabetes, chest pain and leg swelling.    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  -     lisinopril (PRINIVIL,ZESTRIL) 40 MG tablet; Take 1 tablet by mouth Daily.  -     metFORMIN (GLUCOPHAGE) 500 MG tablet; Take 1 tablet by mouth Daily.  -     metoprolol tartrate (LOPRESSOR) 50 MG tablet; Take 1 tablet by mouth 2 (Two) Times a Day.  -     rosuvastatin (CRESTOR) 20 MG tablet; Take 1 tablet by mouth Every Night.  -     albuterol sulfate  (90 Base) MCG/ACT inhaler; Inhale 2 puffs Every 4 (Four) Hours As Needed for Wheezing.      Plan:  1.  Benign essential hypertension: Will continue current medication, low-sodium diet advised, Counseled to regularly check BP at home with goal averaging <130/80.   2.mixed hyperlipidemia: will obtain   fasting CMP and lipid panel.  Diet and exercise counseled,  Will continue current medications  3.  Chest pain: We will refer patient to cardiology and continue current medication and obtain labs  4.  PVD: We will refer patient to cardiology, she is a current smoker  5.  Varicose veins with edema: We will refer patient to cardiology and continue diuretic  6 .  Diabetes mellitus: We will continue current medication -metformin and obtain labs       Genny Delgado MD

## 2020-06-26 LAB
ALBUMIN SERPL-MCNC: 4.5 G/DL (ref 3.5–5.2)
ALBUMIN/GLOB SERPL: 2 G/DL
ALP SERPL-CCNC: 52 U/L (ref 39–117)
ALT SERPL-CCNC: 12 U/L (ref 1–33)
AST SERPL-CCNC: 22 U/L (ref 1–32)
BASOPHILS # BLD AUTO: 0.1 10*3/MM3 (ref 0–0.2)
BASOPHILS NFR BLD AUTO: 0.8 % (ref 0–1.5)
BILIRUB SERPL-MCNC: 0.3 MG/DL (ref 0.2–1.2)
BUN SERPL-MCNC: 16 MG/DL (ref 8–23)
BUN/CREAT SERPL: 15.1 (ref 7–25)
CALCIUM SERPL-MCNC: 9.5 MG/DL (ref 8.6–10.5)
CHLORIDE SERPL-SCNC: 101 MMOL/L (ref 98–107)
CHOLEST SERPL-MCNC: 115 MG/DL (ref 0–200)
CO2 SERPL-SCNC: 27.4 MMOL/L (ref 22–29)
CREAT SERPL-MCNC: 1.06 MG/DL (ref 0.57–1)
EOSINOPHIL # BLD AUTO: 0.55 10*3/MM3 (ref 0–0.4)
EOSINOPHIL NFR BLD AUTO: 4.7 % (ref 0.3–6.2)
ERYTHROCYTE [DISTWIDTH] IN BLOOD BY AUTOMATED COUNT: 12.8 % (ref 12.3–15.4)
GLOBULIN SER CALC-MCNC: 2.3 GM/DL
GLUCOSE SERPL-MCNC: 98 MG/DL (ref 65–99)
HBA1C MFR BLD: 6 % (ref 4.8–5.6)
HCT VFR BLD AUTO: 35 % (ref 34–46.6)
HDLC SERPL-MCNC: 51 MG/DL (ref 40–60)
HGB BLD-MCNC: 11.6 G/DL (ref 12–15.9)
IMM GRANULOCYTES # BLD AUTO: 0.03 10*3/MM3 (ref 0–0.05)
IMM GRANULOCYTES NFR BLD AUTO: 0.3 % (ref 0–0.5)
LDLC SERPL CALC-MCNC: 46 MG/DL (ref 0–100)
LYMPHOCYTES # BLD AUTO: 3.01 10*3/MM3 (ref 0.7–3.1)
LYMPHOCYTES NFR BLD AUTO: 25.5 % (ref 19.6–45.3)
MCH RBC QN AUTO: 31.2 PG (ref 26.6–33)
MCHC RBC AUTO-ENTMCNC: 33.1 G/DL (ref 31.5–35.7)
MCV RBC AUTO: 94.1 FL (ref 79–97)
MONOCYTES # BLD AUTO: 0.86 10*3/MM3 (ref 0.1–0.9)
MONOCYTES NFR BLD AUTO: 7.3 % (ref 5–12)
NEUTROPHILS # BLD AUTO: 7.25 10*3/MM3 (ref 1.7–7)
NEUTROPHILS NFR BLD AUTO: 61.4 % (ref 42.7–76)
NRBC BLD AUTO-RTO: 0 /100 WBC (ref 0–0.2)
PLATELET # BLD AUTO: 357 10*3/MM3 (ref 140–450)
POTASSIUM SERPL-SCNC: 4.4 MMOL/L (ref 3.5–5.2)
PROT SERPL-MCNC: 6.8 G/DL (ref 6–8.5)
RBC # BLD AUTO: 3.72 10*6/MM3 (ref 3.77–5.28)
SODIUM SERPL-SCNC: 136 MMOL/L (ref 136–145)
TRIGL SERPL-MCNC: 90 MG/DL (ref 0–150)
VLDLC SERPL CALC-MCNC: 18 MG/DL
WBC # BLD AUTO: 11.8 10*3/MM3 (ref 3.4–10.8)

## 2020-07-08 ENCOUNTER — TELEPHONE (OUTPATIENT)
Dept: INTERNAL MEDICINE | Facility: CLINIC | Age: 70
End: 2020-07-08

## 2020-07-08 RX ORDER — FLUCONAZOLE 150 MG/1
150 TABLET ORAL ONCE
Qty: 1 TABLET | Refills: 0 | Status: SHIPPED | OUTPATIENT
Start: 2020-07-08 | End: 2020-07-08

## 2020-07-08 NOTE — TELEPHONE ENCOUNTER
Patient is calling stating that she think she has a yeast infection. Patient is having yellowish white discharge, burning, itching, and swollen. Patient would like to know if Dr. Delgado would call in a prescription for her to treat this. Please advise and called back    [phone]    Confirmed pharmacy

## 2020-07-13 RX ORDER — LISINOPRIL 40 MG/1
40 TABLET ORAL DAILY
Qty: 30 TABLET | Refills: 4 | Status: SHIPPED | OUTPATIENT
Start: 2020-07-13 | End: 2020-12-01 | Stop reason: SDUPTHER

## 2020-07-21 ENCOUNTER — TELEPHONE (OUTPATIENT)
Dept: INTERNAL MEDICINE | Facility: CLINIC | Age: 70
End: 2020-07-21

## 2020-07-21 NOTE — TELEPHONE ENCOUNTER
PT CALLED TO REQUEST A NEW HANDICAP STICKER, STATED IT THAT IT IS ABOUT TO  THE END OF THE MONTH.    PLEASE ADVISE.  CALL BACK:[phone]

## 2020-07-21 NOTE — TELEPHONE ENCOUNTER
PT CALLED BACK WITH FAX NUMBER FOR HER HANDICAPPED STICKER TO [facility]    FAX: [phone]

## 2020-08-28 ENCOUNTER — TELEPHONE (OUTPATIENT)
Dept: INTERNAL MEDICINE | Facility: CLINIC | Age: 70
End: 2020-08-28

## 2020-09-09 ENCOUNTER — TELEPHONE (OUTPATIENT)
Dept: INTERNAL MEDICINE | Facility: CLINIC | Age: 70
End: 2020-09-09

## 2020-09-09 RX ORDER — PANTOPRAZOLE SODIUM 40 MG/1
40 TABLET, DELAYED RELEASE ORAL DAILY
Qty: 30 TABLET | Refills: 6 | Status: SHIPPED | OUTPATIENT
Start: 2020-09-09 | End: 2020-10-09

## 2020-09-09 NOTE — TELEPHONE ENCOUNTER
PATIENT CALLED AND STATED SHE HAS A SOUR AND SOUR TASTE IN HER MOUTH. PATIENT WOULD LIKE THE PROVIDER TO CALL IN [facility] FOR HER     [facility] [phone]    PLEASE CALL AND ADVISE [phone]

## 2020-11-23 RX ORDER — HYDROCHLOROTHIAZIDE 12.5 MG/1
12.5 CAPSULE, GELATIN COATED ORAL DAILY
Qty: 150 CAPSULE | OUTPATIENT
Start: 2020-11-23

## 2020-11-23 RX ORDER — METOPROLOL TARTRATE 50 MG/1
TABLET, FILM COATED ORAL
Qty: 300 TABLET | OUTPATIENT
Start: 2020-11-23

## 2020-11-23 RX ORDER — ROSUVASTATIN CALCIUM 20 MG/1
20 TABLET, COATED ORAL NIGHTLY
Qty: 90 TABLET | Refills: 3 | OUTPATIENT
Start: 2020-11-23

## 2020-11-23 NOTE — TELEPHONE ENCOUNTER
Attempted to contact patient; left detailed message per release that appointment was needed before further refills

## 2020-11-24 RX ORDER — HYDROCHLOROTHIAZIDE 12.5 MG/1
12.5 CAPSULE, GELATIN COATED ORAL DAILY
Qty: 30 CAPSULE | Refills: 4 | OUTPATIENT
Start: 2020-11-24

## 2020-11-24 RX ORDER — ROSUVASTATIN CALCIUM 20 MG/1
20 TABLET, COATED ORAL NIGHTLY
Qty: 30 TABLET | Refills: 4 | OUTPATIENT
Start: 2020-11-24

## 2020-11-24 RX ORDER — METOPROLOL TARTRATE 50 MG/1
50 TABLET, FILM COATED ORAL 2 TIMES DAILY
Qty: 60 TABLET | Refills: 4 | OUTPATIENT
Start: 2020-11-24

## 2020-11-30 RX ORDER — LISINOPRIL 40 MG/1
40 TABLET ORAL DAILY
Qty: 30 TABLET | Refills: 4 | OUTPATIENT
Start: 2020-11-30

## 2020-11-30 NOTE — TELEPHONE ENCOUNTER
Caller: Glendy Garcia    Relationship: Self    Best call back number: 11/30/2020    Medication needed:   Requested Prescriptions     Pending Prescriptions Disp Refills   • metFORMIN (GLUCOPHAGE) 500 MG tablet 30 tablet 4     Sig: Take 1 tablet by mouth Daily.   • lisinopril (PRINIVIL,ZESTRIL) 40 MG tablet 30 tablet 4     Sig: Take 1 tablet by mouth Daily.       When do you need the refill by: 11/302020    What details did the patient provide when requesting the medication: PATIENT IS COMPLETELY OUT OF MEDICATION  AND NBEEDS REFILL     Does the patient have less than a 3 day supply:  [x] Yes  [] No    What is the patient's [id number] [address] AT [facility] [phone] [facility] [phone] FX

## 2020-12-01 ENCOUNTER — DOCUMENTATION (OUTPATIENT)
Dept: INTERNAL MEDICINE | Facility: CLINIC | Age: 70
End: 2020-12-01

## 2020-12-01 DIAGNOSIS — E78.2 MIXED HYPERLIPIDEMIA: Primary | ICD-10-CM

## 2020-12-01 DIAGNOSIS — E11.9 DIABETES MELLITUS WITHOUT COMPLICATION (HCC): ICD-10-CM

## 2020-12-01 RX ORDER — METOPROLOL TARTRATE 50 MG/1
50 TABLET, FILM COATED ORAL 2 TIMES DAILY
Qty: 28 TABLET | Refills: 0 | Status: SHIPPED | OUTPATIENT
Start: 2020-12-01 | End: 2020-12-17 | Stop reason: SDUPTHER

## 2020-12-01 RX ORDER — LISINOPRIL 40 MG/1
40 TABLET ORAL DAILY
Qty: 14 TABLET | Refills: 0 | Status: SHIPPED | OUTPATIENT
Start: 2020-12-01 | End: 2020-12-17 | Stop reason: SDUPTHER

## 2020-12-01 RX ORDER — HYDROCHLOROTHIAZIDE 12.5 MG/1
12.5 CAPSULE, GELATIN COATED ORAL DAILY
Qty: 14 CAPSULE | Refills: 0 | Status: SHIPPED | OUTPATIENT
Start: 2020-12-01 | End: 2020-12-17 | Stop reason: SDUPTHER

## 2020-12-01 NOTE — TELEPHONE ENCOUNTER
Patient had refills denied last week but she had an appointment today that was cancelled. She has a future appt on 12/17/2020.

## 2020-12-01 NOTE — TELEPHONE ENCOUNTER
PT CALLED TO REQUEST REFILL FOR RX  metoprolol tartrate (LOPRESSOR) 50 MG tablet  AND  metFORMIN (GLUCOPHAGE) 500 MG tablet.  PT IS COMPLETELY OUT OF THE RX.  PT STATED THAT SOMEONE CALLED HER FORM OFFICE AND CANCELLED HER APPT FOR TODAY AND WAS TLD THAT THE RX'S WAS GOING TO BE SENT PHARMACY AND NOT HAS BEEN SENT.    PT WOULD LIKE A CALL ONCE RX'S HAVE BEEN SENT TO PHARMACY.        PLEASE ADVISE.  CALL BACK:[phone]    [id number] [address] AT [facility]

## 2020-12-17 ENCOUNTER — OFFICE VISIT (OUTPATIENT)
Dept: INTERNAL MEDICINE | Facility: CLINIC | Age: 70
End: 2020-12-17

## 2020-12-17 VITALS
TEMPERATURE: 97.5 F | HEIGHT: 58 IN | HEART RATE: 56 BPM | SYSTOLIC BLOOD PRESSURE: 136 MMHG | BODY MASS INDEX: 22.88 KG/M2 | RESPIRATION RATE: 16 BRPM | WEIGHT: 109 LBS | OXYGEN SATURATION: 96 % | DIASTOLIC BLOOD PRESSURE: 72 MMHG

## 2020-12-17 DIAGNOSIS — E78.2 MIXED HYPERLIPIDEMIA: ICD-10-CM

## 2020-12-17 DIAGNOSIS — N39.0 ACUTE URINARY TRACT INFECTION: ICD-10-CM

## 2020-12-17 DIAGNOSIS — Z23 NEED FOR IMMUNIZATION AGAINST INFLUENZA: ICD-10-CM

## 2020-12-17 DIAGNOSIS — E11.9 CONTROLLED TYPE 2 DIABETES MELLITUS WITHOUT COMPLICATION, WITHOUT LONG-TERM CURRENT USE OF INSULIN (HCC): ICD-10-CM

## 2020-12-17 DIAGNOSIS — I10 BENIGN ESSENTIAL HYPERTENSION: Primary | ICD-10-CM

## 2020-12-17 PROCEDURE — G0008 ADMIN INFLUENZA VIRUS VAC: HCPCS | Performed by: INTERNAL MEDICINE

## 2020-12-17 PROCEDURE — 90694 VACC AIIV4 NO PRSRV 0.5ML IM: CPT | Performed by: INTERNAL MEDICINE

## 2020-12-17 PROCEDURE — 99214 OFFICE O/P EST MOD 30 MIN: CPT | Performed by: INTERNAL MEDICINE

## 2020-12-17 RX ORDER — LISINOPRIL 40 MG/1
40 TABLET ORAL DAILY
Qty: 30 TABLET | Refills: 4 | Status: SHIPPED | OUTPATIENT
Start: 2020-12-17 | End: 2021-05-12 | Stop reason: SDUPTHER

## 2020-12-17 RX ORDER — ROSUVASTATIN CALCIUM 20 MG/1
20 TABLET, COATED ORAL NIGHTLY
Qty: 30 TABLET | Refills: 4 | Status: SHIPPED | OUTPATIENT
Start: 2020-12-17 | End: 2021-05-12 | Stop reason: SDUPTHER

## 2020-12-17 RX ORDER — GABAPENTIN 100 MG/1
3 CAPSULE ORAL
COMMUNITY
Start: 2020-11-28

## 2020-12-17 RX ORDER — METOPROLOL TARTRATE 50 MG/1
50 TABLET, FILM COATED ORAL 2 TIMES DAILY
Qty: 60 TABLET | Refills: 4 | Status: SHIPPED | OUTPATIENT
Start: 2020-12-17 | End: 2021-05-12 | Stop reason: SDUPTHER

## 2020-12-17 RX ORDER — HYDROCHLOROTHIAZIDE 12.5 MG/1
12.5 CAPSULE, GELATIN COATED ORAL DAILY
Qty: 30 CAPSULE | Refills: 4 | Status: SHIPPED | OUTPATIENT
Start: 2020-12-17 | End: 2021-05-12

## 2020-12-17 RX ORDER — OXYCODONE 9 MG/1
CAPSULE, EXTENDED RELEASE ORAL
COMMUNITY
End: 2022-12-08

## 2020-12-17 NOTE — PATIENT INSTRUCTIONS
MyPlate from USDA    MyPlate is an outline of a general healthy diet based on the 2010 Dietary Guidelines for Americans, from the U.S. Department of Agriculture (USDA). It sets guidelines for how much food you should eat from each food group based on your age, sex, and level of physical activity.  What are tips for following MyPlate?  To follow MyPlate recommendations:  · Eat a wide variety of fruits and vegetables, grains, and protein foods.  · Serve smaller portions and eat less food throughout the day.  · Limit portion sizes to avoid overeating.  · Enjoy your food.  · Get at least 150 minutes of exercise every week. This is about 30 minutes each day, 5 or more days per week.  It can be difficult to have every meal look like MyPlate. Think about MyPlate as eating guidelines for an entire day, rather than each individual meal.  Fruits and vegetables  · Make half of your plate fruits and vegetables.  · Eat many different colors of fruits and vegetables each day.  · For a 2,000 calorie daily food plan, eat:  ? 2½ cups of vegetables every day.  ? 2 cups of fruit every day.  · 1 cup is equal to:  ? 1 cup raw or cooked vegetables.  ? 1 cup raw fruit.  ? 1 medium-sized orange, apple, or banana.  ? 1 cup 100% fruit or vegetable juice.  ? 2 cups raw leafy greens, such as lettuce, spinach, or kale.  ? ½ cup dried fruit.  Grains  · One fourth of your plate should be grains.  · Make at least half of the grains you eat each day whole grains.  · For a 2,000 calorie daily food plan, eat 6 oz of grains every day.  · 1 oz is equal to:  ? 1 slice bread.  ? 1 cup cereal.  ? ½ cup cooked rice, cereal, or pasta.  Protein  · One fourth of your plate should be protein.  · Eat a wide variety of protein foods, including meat, poultry, fish, eggs, beans, nuts, and tofu.  · For a 2,000 calorie daily food plan, eat 5½ oz of protein every day.  · 1 oz is equal to:  ? 1 oz meat, poultry, or fish.  ? ¼ cup cooked beans.  ? 1 egg.  ? ½ oz nuts  or seeds.  ? 1 Tbsp peanut butter.  Dairy  · Drink fat-free or low-fat (1%) milk.  · Eat or drink dairy as a side to meals.  · For a 2,000 calorie daily food plan, eat or drink 3 cups of dairy every day.  · 1 cup is equal to:  ? 1 cup milk, yogurt, cottage cheese, or soy milk (soy beverage).  ? 2 oz processed cheese.  ? 1½ oz natural cheese.  Fats, oils, salt, and sugars  · Only small amounts of oils are recommended.  · Avoid foods that are high in calories and low in nutritional value (empty calories), like foods high in fat or added sugars.  · Choose foods that are low in salt (sodium). Choose foods that have less than 140 milligrams (mg) of sodium per serving.  · Drink water instead of sugary drinks. Drink enough water each day to keep your urine pale yellow.  Where to find support  · Work with your health care provider or a nutrition specialist (dietitian) to develop a customized eating plan that is right for you.  · Download an rach (mobile application) to help you track your daily food intake.  Where to find more information  · Go to ChooseMyPlate.gov for more information.  Summary  · MyPlate is a general guideline for healthy eating from the USDA. It is based on the 2010 Dietary Guidelines for Americans.  · In general, fruits and vegetables should take up ½ of your plate, grains should take up ¼ of your plate, and protein should take up ¼ of your plate.  This information is not intended to replace advice given to you by your health care provider. Make sure you discuss any questions you have with your health care provider.  Document Revised: 05/21/2020 Document Reviewed: 03/19/2018  Elsevier Patient Education © 2020 [employer]

## 2020-12-17 NOTE — PROGRESS NOTES
Subjective   Glendy Garcia is a 70 y.o. female.     Chief Complaint   Patient presents with   • Hypertension   • Hyperlipidemia   • Diabetes   • Urinary Tract Infection       History of Present Illness   HPI: Patient is here to follow up on the blood pressure  The patient is taking the blood pressure medications as prescribed and has had no side effects. The patient is also here to follow up on the cholesterol and is trying to follow a diet. The patient is also here to follow on sugar   and is  due to get lab work done . The patient also needs refills on medications and a flu vaccine.  Patient also complains of problems urinating with frequency and burning sensation  Hypertension   Pertinent negatives include no chest pain, palpitations or shortness of breath.   Hyperlipidemia   Pertinent negatives include no chest pain or shortness of breath.   Diabetes   Pertinent negatives for hypoglycemia include no dizziness, nervousness/anxiousness or pallor. Pertinent negatives for diabetes include no chest pain, no shortness of breath  Patient is on acei/arb     The following portions of the patient's history were reviewed and updated as appropriate: allergies, current medications, past family history, past medical history, past social history, past surgical history and problem list.    Review of Systems   Constitutional: Negative for appetite change, fatigue and fever.   HENT: Negative for congestion, ear discharge, ear pain, sinus pressure and sore throat.    Eyes: Negative for pain and discharge.   Respiratory: Negative for cough, chest tightness, shortness of breath and wheezing.    Cardiovascular: Negative for chest pain, palpitations and leg swelling.   Gastrointestinal: Negative for abdominal pain, blood in stool, constipation, diarrhea and nausea.   Endocrine: Negative for cold intolerance and heat intolerance.   Genitourinary: Positive for dysuria and frequency. Negative for flank pain.   Musculoskeletal: Negative for  back pain and joint swelling.   Skin: Negative for color change.   Allergic/Immunologic: Negative for environmental allergies and food allergies.   Neurological: Negative for dizziness, weakness, numbness and headaches.   Hematological: Negative for adenopathy. Does not bruise/bleed easily.   Psychiatric/Behavioral: Negative for behavioral problems and dysphoric mood. The patient is not nervous/anxious.          Current Outpatient Medications:   •  albuterol sulfate  (90 Base) MCG/ACT inhaler, Inhale 2 puffs Every 4 (Four) Hours As Needed for Wheezing., Disp: 1 inhaler, Rfl: 2  •  aspirin 325 MG tablet, Take 1 tablet by mouth., Disp: , Rfl:   •  gabapentin (NEURONTIN) 100 MG capsule, Take 3 capsules by mouth every night at bedtime., Disp: , Rfl:   •  Lancets (FREESTYLE) lancets, , Disp: , Rfl:   •  Omega-3 Fatty Acids (OMEGA 3 PO), Take 2 capsules by mouth 3 (Three) Times a Day., Disp: , Rfl:   •  oxyCODONE ER (Xtampza ER) 9 MG capsule extended-release 12 hour , Take  by mouth., Disp: , Rfl:   •  oxyCODONE-acetaminophen (PERCOCET) 5-325 MG per tablet, Take 1 tablet by mouth Every 8 (Eight) Hours., Disp: , Rfl: 0  •  rosuvastatin (CRESTOR) 20 MG tablet, Take 1 tablet by mouth Every Night., Disp: 30 tablet, Rfl: 4  •  tiotropium (SPIRIVA HANDIHALER) 18 MCG per inhalation capsule, Spiriva HandiHaler 18 MCG Inhalation Capsule; Patient Sig: Spiriva HandiHaler 18 MCG Inhalation Capsule USE AS DIRECTED.; 0; 28-Jan-2014; Active, Disp: , Rfl:   •  umeclidinium-vilanterol (ANORO ELLIPTA) 62.5-25 MCG/INH aerosol powder  inhaler, Inhale 1 puff Daily., Disp: 1 each, Rfl: 5  •  hydroCHLOROthiazide (MICROZIDE) 12.5 MG capsule, Take 1 capsule by mouth Daily for 30 days., Disp: 30 capsule, Rfl: 4  •  lisinopril (PRINIVIL,ZESTRIL) 40 MG tablet, Take 1 tablet by mouth Daily for 30 days., Disp: 30 tablet, Rfl: 4  •  metFORMIN (GLUCOPHAGE) 500 MG tablet, Take 1 tablet by mouth Daily for 30 days., Disp: 30 tablet, Rfl: 4  •   "metoprolol tartrate (LOPRESSOR) 50 MG tablet, Take 1 tablet by mouth 2 (Two) Times a Day for 30 days., Disp: 60 tablet, Rfl: 4    Objective     Blood pressure 136/72, pulse 56, temperature 97.5 °F (36.4 °C), resp. rate 16, height 147.3 cm (57.99\"), weight 49.4 kg (109 lb), SpO2 96 %, not currently breastfeeding.    Physical Exam  Vitals signs and nursing note reviewed.   Constitutional:       General: She is not in acute distress.     Appearance: Normal appearance. She is not diaphoretic.   HENT:      Head: Normocephalic and atraumatic.      Right Ear: External ear normal.      Left Ear: External ear normal.      Nose: Nose normal.   Eyes:      Extraocular Movements: Extraocular movements intact.      Conjunctiva/sclera: Conjunctivae normal.   Neck:      Musculoskeletal: Neck supple.      Trachea: Trachea normal.   Cardiovascular:      Rate and Rhythm: Normal rate and regular rhythm.      Heart sounds: Normal heart sounds.   Pulmonary:      Effort: Pulmonary effort is normal. No respiratory distress.      Breath sounds: Normal breath sounds.   Abdominal:      General: Abdomen is flat.   Musculoskeletal:      Comments: Moves all limbs   Skin:     General: Skin is warm.   Neurological:      Mental Status: She is alert and oriented to person, place, and time.      Comments: No gross motor or sensory deficits       Patient's Body mass index is 22.79 kg/m². BMI is within normal parameters. No follow-up required..      Results for orders placed or performed in visit on 12/17/20   Urine Culture - Urine, Urine, Clean Catch    Specimen: Urine, Clean Catch    UR   Result Value Ref Range    Urine Culture Final report     Result 1 Comment    Comprehensive Metabolic Panel    Specimen: Blood   Result Value Ref Range    Glucose 79 65 - 99 mg/dL    BUN 15 8 - 23 mg/dL    Creatinine 1.09 (H) 0.57 - 1.00 mg/dL    eGFR Non African Am 50 (L) >60 mL/min/1.73    eGFR African Am 60 (L) >60 mL/min/1.73    BUN/Creatinine Ratio 13.8 7.0 - " 25.0    Sodium 138 136 - 145 mmol/L    Potassium 4.3 3.5 - 5.2 mmol/L    Chloride 103 98 - 107 mmol/L    Total CO2 25.5 22.0 - 29.0 mmol/L    Calcium 9.5 8.6 - 10.5 mg/dL    Total Protein 6.5 6.0 - 8.5 g/dL    Albumin 4.20 3.50 - 5.20 g/dL    Globulin 2.3 gm/dL    A/G Ratio 1.8 g/dL    Total Bilirubin 0.3 0.0 - 1.2 mg/dL    Alkaline Phosphatase 45 39 - 117 U/L    AST (SGOT) 24 1 - 32 U/L    ALT (SGPT) 12 1 - 33 U/L   Lipid Panel    Specimen: Blood   Result Value Ref Range    Total Cholesterol 104 0 - 200 mg/dL    Triglycerides 77 0 - 150 mg/dL    HDL Cholesterol 50 40 - 60 mg/dL    VLDL Cholesterol Erlin 16 5 - 40 mg/dL    LDL Chol Calc (NIH) 38 0 - 100 mg/dL   Hemoglobin A1c    Specimen: Blood   Result Value Ref Range    Hemoglobin A1C 5.73 (H) 4.80 - 5.60 %   Microalbumin / Creatinine Urine Ratio - Urine, Clean Catch    Specimen: Urine, Clean Catch   Result Value Ref Range    Creatinine, Urine 29.4 Not Estab. mg/dL    Microalbumin, Urine 12.5 Not Estab. ug/mL    Microalbumin/Creatinine Ratio 43 (H) 0 - 29 mg/g creat   CBC & Differential    Specimen: Blood   Result Value Ref Range    WBC 8.30 3.40 - 10.80 10*3/mm3    RBC 3.68 (L) 3.77 - 5.28 10*6/mm3    Hemoglobin 11.7 (L) 12.0 - 15.9 g/dL    Hematocrit 35.4 34.0 - 46.6 %    MCV 96.2 79.0 - 97.0 fL    MCH 31.8 26.6 - 33.0 pg    MCHC 33.1 31.5 - 35.7 g/dL    RDW 12.6 12.3 - 15.4 %    Platelets 248 140 - 450 10*3/mm3    Neutrophil Rel % 50.7 42.7 - 76.0 %    Lymphocyte Rel % 36.7 19.6 - 45.3 %    Monocyte Rel % 8.2 5.0 - 12.0 %    Eosinophil Rel % 3.5 0.3 - 6.2 %    Basophil Rel % 0.5 0.0 - 1.5 %    Neutrophils Absolute 4.21 1.70 - 7.00 10*3/mm3    Lymphocytes Absolute 3.05 0.70 - 3.10 10*3/mm3    Monocytes Absolute 0.68 0.10 - 0.90 10*3/mm3    Eosinophils Absolute 0.29 0.00 - 0.40 10*3/mm3    Basophils Absolute 0.04 0.00 - 0.20 10*3/mm3    Immature Granulocyte Rel % 0.4 0.0 - 0.5 %    Immature Grans Absolute 0.03 0.00 - 0.05 10*3/mm3    nRBC 0.0 0.0 - 0.2 /100 WBC          Assessment/Plan   Diagnoses and all orders for this visit:    1. Benign essential hypertension (Primary)    2. Mixed hyperlipidemia  -     CBC & Differential  -     Comprehensive Metabolic Panel  -     Lipid Panel    3. Controlled type 2 diabetes mellitus without complication, without long-term current use of insulin (CMS/[facility])  -     Hemoglobin A1c  -     Microalbumin / Creatinine Urine Ratio - Urine, Clean Catch    4. Acute urinary tract infection  -     Urine Culture - Urine, Urine, Clean Catch    5. Need for immunization against influenza  -     Fluad Quad 65+ yrs ([id number])    Other orders  -     rosuvastatin (CRESTOR) 20 MG tablet; Take 1 tablet by mouth Every Night.  Dispense: 30 tablet; Refill: 4  -     metoprolol tartrate (LOPRESSOR) 50 MG tablet; Take 1 tablet by mouth 2 (Two) Times a Day for 30 days.  Dispense: 60 tablet; Refill: 4  -     metFORMIN (GLUCOPHAGE) 500 MG tablet; Take 1 tablet by mouth Daily for 30 days.  Dispense: 30 tablet; Refill: 4  -     lisinopril (PRINIVIL,ZESTRIL) 40 MG tablet; Take 1 tablet by mouth Daily for 30 days.  Dispense: 30 tablet; Refill: 4  -     hydroCHLOROthiazide (MICROZIDE) 12.5 MG capsule; Take 1 capsule by mouth Daily for 30 days.  Dispense: 30 capsule; Refill: 4        Plan:  1.  Benign essential hypertension: Will continue current medication, low-sodium diet advised, Counseled to regularly check BP at home with goal averaging <130/80.   2.mixed hyperlipidemia: will obtain   fasting CMP and lipid panel.  Diet and exercise counseled,  Will continue current medications  3. Diabetes  Mellitus  : will obtain   fasting CMP  and hba1c  , diet and exercise counseled , Will continue current medications  4.  Acute urinary tract infection: We will get urine culture, start oral antibiotics and oral hydration advised  5.  Need for flu vaccine: Given today       Genny Delgado MD

## 2020-12-19 LAB
ALBUMIN SERPL-MCNC: 4.2 G/DL (ref 3.5–5.2)
ALBUMIN/CREAT UR: 43 MG/G CREAT (ref 0–29)
ALBUMIN/GLOB SERPL: 1.8 G/DL
ALP SERPL-CCNC: 45 U/L (ref 39–117)
ALT SERPL-CCNC: 12 U/L (ref 1–33)
AST SERPL-CCNC: 24 U/L (ref 1–32)
BACTERIA UR CULT: NORMAL
BACTERIA UR CULT: NORMAL
BASOPHILS # BLD AUTO: 0.04 10*3/MM3 (ref 0–0.2)
BASOPHILS NFR BLD AUTO: 0.5 % (ref 0–1.5)
BILIRUB SERPL-MCNC: 0.3 MG/DL (ref 0–1.2)
BUN SERPL-MCNC: 15 MG/DL (ref 8–23)
BUN/CREAT SERPL: 13.8 (ref 7–25)
CALCIUM SERPL-MCNC: 9.5 MG/DL (ref 8.6–10.5)
CHLORIDE SERPL-SCNC: 103 MMOL/L (ref 98–107)
CHOLEST SERPL-MCNC: 104 MG/DL (ref 0–200)
CO2 SERPL-SCNC: 25.5 MMOL/L (ref 22–29)
CREAT SERPL-MCNC: 1.09 MG/DL (ref 0.57–1)
CREAT UR-MCNC: 29.4 MG/DL
EOSINOPHIL # BLD AUTO: 0.29 10*3/MM3 (ref 0–0.4)
EOSINOPHIL NFR BLD AUTO: 3.5 % (ref 0.3–6.2)
ERYTHROCYTE [DISTWIDTH] IN BLOOD BY AUTOMATED COUNT: 12.6 % (ref 12.3–15.4)
GLOBULIN SER CALC-MCNC: 2.3 GM/DL
GLUCOSE SERPL-MCNC: 79 MG/DL (ref 65–99)
HBA1C MFR BLD: 5.73 % (ref 4.8–5.6)
HCT VFR BLD AUTO: 35.4 % (ref 34–46.6)
HDLC SERPL-MCNC: 50 MG/DL (ref 40–60)
HGB BLD-MCNC: 11.7 G/DL (ref 12–15.9)
IMM GRANULOCYTES # BLD AUTO: 0.03 10*3/MM3 (ref 0–0.05)
IMM GRANULOCYTES NFR BLD AUTO: 0.4 % (ref 0–0.5)
LDLC SERPL CALC-MCNC: 38 MG/DL (ref 0–100)
LYMPHOCYTES # BLD AUTO: 3.05 10*3/MM3 (ref 0.7–3.1)
LYMPHOCYTES NFR BLD AUTO: 36.7 % (ref 19.6–45.3)
MCH RBC QN AUTO: 31.8 PG (ref 26.6–33)
MCHC RBC AUTO-ENTMCNC: 33.1 G/DL (ref 31.5–35.7)
MCV RBC AUTO: 96.2 FL (ref 79–97)
MICROALBUMIN UR-MCNC: 12.5 UG/ML
MONOCYTES # BLD AUTO: 0.68 10*3/MM3 (ref 0.1–0.9)
MONOCYTES NFR BLD AUTO: 8.2 % (ref 5–12)
NEUTROPHILS # BLD AUTO: 4.21 10*3/MM3 (ref 1.7–7)
NEUTROPHILS NFR BLD AUTO: 50.7 % (ref 42.7–76)
NRBC BLD AUTO-RTO: 0 /100 WBC (ref 0–0.2)
PLATELET # BLD AUTO: 248 10*3/MM3 (ref 140–450)
POTASSIUM SERPL-SCNC: 4.3 MMOL/L (ref 3.5–5.2)
PROT SERPL-MCNC: 6.5 G/DL (ref 6–8.5)
RBC # BLD AUTO: 3.68 10*6/MM3 (ref 3.77–5.28)
SODIUM SERPL-SCNC: 138 MMOL/L (ref 136–145)
TRIGL SERPL-MCNC: 77 MG/DL (ref 0–150)
VLDLC SERPL CALC-MCNC: 16 MG/DL (ref 5–40)
WBC # BLD AUTO: 8.3 10*3/MM3 (ref 3.4–10.8)

## 2020-12-21 ENCOUNTER — TELEPHONE (OUTPATIENT)
Dept: INTERNAL MEDICINE | Facility: CLINIC | Age: 70
End: 2020-12-21

## 2020-12-21 NOTE — TELEPHONE ENCOUNTER
PATIENT CALLED STATING THAT A NURSE CALLED AND GAVE HER, HER LAB RESULTS.    HER A1C WAS ELEVATED AND SHE WANTS TO KNOW WHAT SHE NEEDS TO DO TO HELP LEVEL IT.    PATIENT STATED SHE KNOWS SHE HAS A POOR DIET BUT DOES SHE NEED TO BE ON MEDICATION OR WHAT STEPS SHOULD SHE TAKE TO HELP THIS ISSUE.    PLEASE ADVISE AND CALL PATIENT [phone]

## 2020-12-21 NOTE — TELEPHONE ENCOUNTER
Advised pt to watch carb intake drink lots of water check labs in April pt verbalized understanding, stated she was worried about going into a diabetic coma

## 2021-05-07 ENCOUNTER — TELEPHONE (OUTPATIENT)
Dept: INTERNAL MEDICINE | Facility: CLINIC | Age: 71
End: 2021-05-07

## 2021-05-08 RX ORDER — AZITHROMYCIN 250 MG/1
TABLET, FILM COATED ORAL
Qty: 6 TABLET | Refills: 0 | Status: SHIPPED | OUTPATIENT
Start: 2021-05-08 | End: 2021-05-12

## 2021-05-12 ENCOUNTER — OFFICE VISIT (OUTPATIENT)
Dept: INTERNAL MEDICINE | Facility: CLINIC | Age: 71
End: 2021-05-12

## 2021-05-12 VITALS
OXYGEN SATURATION: 95 % | DIASTOLIC BLOOD PRESSURE: 69 MMHG | BODY MASS INDEX: 22.67 KG/M2 | RESPIRATION RATE: 14 BRPM | HEART RATE: 54 BPM | HEIGHT: 58 IN | TEMPERATURE: 97.7 F | SYSTOLIC BLOOD PRESSURE: 120 MMHG | WEIGHT: 108 LBS

## 2021-05-12 DIAGNOSIS — E11.9 CONTROLLED TYPE 2 DIABETES MELLITUS WITHOUT COMPLICATION, WITHOUT LONG-TERM CURRENT USE OF INSULIN (HCC): ICD-10-CM

## 2021-05-12 DIAGNOSIS — E78.2 MIXED HYPERLIPIDEMIA: ICD-10-CM

## 2021-05-12 DIAGNOSIS — R53.83 MALAISE AND FATIGUE: ICD-10-CM

## 2021-05-12 DIAGNOSIS — R53.81 MALAISE AND FATIGUE: ICD-10-CM

## 2021-05-12 DIAGNOSIS — N39.0 ACUTE URINARY TRACT INFECTION: ICD-10-CM

## 2021-05-12 DIAGNOSIS — I10 BENIGN ESSENTIAL HYPERTENSION: Primary | ICD-10-CM

## 2021-05-12 PROCEDURE — 99214 OFFICE O/P EST MOD 30 MIN: CPT | Performed by: INTERNAL MEDICINE

## 2021-05-12 RX ORDER — ALBUTEROL SULFATE 90 UG/1
2 AEROSOL, METERED RESPIRATORY (INHALATION) EVERY 4 HOURS PRN
Qty: 18 G | Refills: 4 | Status: SHIPPED | OUTPATIENT
Start: 2021-05-12 | End: 2021-09-16

## 2021-05-12 RX ORDER — METOPROLOL TARTRATE 50 MG/1
50 TABLET, FILM COATED ORAL 2 TIMES DAILY
Qty: 60 TABLET | Refills: 4 | Status: SHIPPED | OUTPATIENT
Start: 2021-05-12 | End: 2021-10-11 | Stop reason: SDUPTHER

## 2021-05-12 RX ORDER — ROSUVASTATIN CALCIUM 20 MG/1
20 TABLET, COATED ORAL NIGHTLY
Qty: 30 TABLET | Refills: 4 | Status: SHIPPED | OUTPATIENT
Start: 2021-05-12 | End: 2021-10-11 | Stop reason: SDUPTHER

## 2021-05-12 RX ORDER — LISINOPRIL 40 MG/1
40 TABLET ORAL DAILY
Qty: 30 TABLET | Refills: 4 | Status: SHIPPED | OUTPATIENT
Start: 2021-05-12 | End: 2021-10-11 | Stop reason: SDUPTHER

## 2021-05-12 NOTE — PATIENT INSTRUCTIONS
MyPlate from USDA    MyPlate is an outline of a general healthy diet based on the 2010 Dietary Guidelines for Americans, from the U.S. Department of Agriculture (USDA). It sets guidelines for how much food you should eat from each food group based on your age, sex, and level of physical activity.  What are tips for following MyPlate?  To follow MyPlate recommendations:  · Eat a wide variety of fruits and vegetables, grains, and protein foods.  · Serve smaller portions and eat less food throughout the day.  · Limit portion sizes to avoid overeating.  · Enjoy your food.  · Get at least 150 minutes of exercise every week. This is about 30 minutes each day, 5 or more days per week.  It can be difficult to have every meal look like MyPlate. Think about MyPlate as eating guidelines for an entire day, rather than each individual meal.  Fruits and vegetables  · Make half of your plate fruits and vegetables.  · Eat many different colors of fruits and vegetables each day.  · For a 2,000 calorie daily food plan, eat:  ? 2½ cups of vegetables every day.  ? 2 cups of fruit every day.  · 1 cup is equal to:  ? 1 cup raw or cooked vegetables.  ? 1 cup raw fruit.  ? 1 medium-sized orange, apple, or banana.  ? 1 cup 100% fruit or vegetable juice.  ? 2 cups raw leafy greens, such as lettuce, spinach, or kale.  ? ½ cup dried fruit.  Grains  · One fourth of your plate should be grains.  · Make at least half of the grains you eat each day whole grains.  · For a 2,000 calorie daily food plan, eat 6 oz of grains every day.  · 1 oz is equal to:  ? 1 slice bread.  ? 1 cup cereal.  ? ½ cup cooked rice, cereal, or pasta.  Protein  · One fourth of your plate should be protein.  · Eat a wide variety of protein foods, including meat, poultry, fish, eggs, beans, nuts, and tofu.  · For a 2,000 calorie daily food plan, eat 5½ oz of protein every day.  · 1 oz is equal to:  ? 1 oz meat, poultry, or fish.  ? ¼ cup cooked beans.  ? 1 egg.  ? ½ oz nuts  or seeds.  ? 1 Tbsp peanut butter.  Dairy  · Drink fat-free or low-fat (1%) milk.  · Eat or drink dairy as a side to meals.  · For a 2,000 calorie daily food plan, eat or drink 3 cups of dairy every day.  · 1 cup is equal to:  ? 1 cup milk, yogurt, cottage cheese, or soy milk (soy beverage).  ? 2 oz processed cheese.  ? 1½ oz natural cheese.  Fats, oils, salt, and sugars  · Only small amounts of oils are recommended.  · Avoid foods that are high in calories and low in nutritional value (empty calories), like foods high in fat or added sugars.  · Choose foods that are low in salt (sodium). Choose foods that have less than 140 milligrams (mg) of sodium per serving.  · Drink water instead of sugary drinks. Drink enough water each day to keep your urine pale yellow.  Where to find support  · Work with your health care provider or a nutrition specialist (dietitian) to develop a customized eating plan that is right for you.  · Download an rach (mobile application) to help you track your daily food intake.  Where to find more information  · Go to ChooseMyPlate.gov for more information.  Summary  · MyPlate is a general guideline for healthy eating from the USDA. It is based on the 2010 Dietary Guidelines for Americans.  · In general, fruits and vegetables should take up ½ of your plate, grains should take up ¼ of your plate, and protein should take up ¼ of your plate.  This information is not intended to replace advice given to you by your health care provider. Make sure you discuss any questions you have with your health care provider.  Document Revised: 05/21/2020 Document Reviewed: 03/19/2018  Elsevier Patient Education © 2021 Elsevier Inc.

## 2021-05-12 NOTE — PROGRESS NOTES
"Chief Complaint  Hypertension, Hyperlipidemia, Diabetes, and Urinary Tract Infection    Subjective          Glendy Garcia presents to [facility]  History of Present Illness  HPI: Patient is here to follow up on the blood pressure  The patient is taking the blood pressure medications as prescribed and has had no side effects. The patient is also here to follow up on the cholesterol and is trying to follow a diet. The patient is also here to follow on sugar and   is  due to get lab work done . The patient also needs refills on medications .  Patient also complains of dysuria and odorous urine and thinks she has a UTI, she also complains of fatigue and would like to have thyroid checked  Hypertension   Pertinent negatives include no chest pain, palpitations or shortness of breath.   Hyperlipidemia   Pertinent negatives include no chest pain or shortness of breath.   Diabetes   Pertinent negatives for hypoglycemia include no dizziness, nervousness/anxiousness or pallor. Pertinent negatives for diabetes include no chest pain, no shortness of breath  Patient is on acei/arb     Objective   Vital Signs:   /69   Pulse 54   Temp 97.7 °F (36.5 °C)   Resp 14   Ht 147.3 cm (57.99\")   Wt 49 kg (108 lb)   SpO2 95%   BMI 22.58 kg/m²     Physical Exam  Vitals and nursing note reviewed.   Constitutional:       General: She is not in acute distress.     Appearance: Normal appearance. She is not diaphoretic.   HENT:      Head: Normocephalic and atraumatic.      Right Ear: External ear normal.      Left Ear: External ear normal.      Nose: Nose normal.   Eyes:      Extraocular Movements: Extraocular movements intact.      Conjunctiva/sclera: Conjunctivae normal.   Neck:      Trachea: Trachea normal.   Cardiovascular:      Rate and Rhythm: Normal rate and regular rhythm.      Heart sounds: Normal heart sounds.   Pulmonary:      Effort: Pulmonary effort is normal. No respiratory distress.      " Breath sounds: Normal breath sounds.   Abdominal:      General: Abdomen is flat.   Musculoskeletal:      Cervical back: Neck supple.      Comments: Moves all limbs  Walks with walker   Skin:     General: Skin is warm.   Neurological:      Mental Status: She is alert and oriented to person, place, and time.      Comments: No gross motor or sensory deficits        Result Review :     Common labs    Common Labsle 6/25/20 6/25/20 6/25/20 6/25/20 12/17/20 12/17/20 12/17/20 12/17/20 12/17/20    1156 1156 1156 1156 1451 1451 1451 1451 1451   Glucose  98    79      BUN  16    15      Creatinine  1.06 (A)    1.09 (A)      eGFR Non  Am  51 (A)    50 (A)      eGFR  Am  62    60 (A)      Sodium  136    138      Potassium  4.4    4.3      Chloride  101    103      Calcium  9.5    9.5      Total Protein  6.8    6.5      Albumin  4.50    4.20      Total Bilirubin  0.3    0.3      Alkaline Phosphatase  52    45      AST (SGOT)  22    24      ALT (SGPT)  12    12      WBC 11.80 (A)    8.30       Hemoglobin 11.6 (A)    11.7 (A)       Hematocrit 35.0    35.4       Platelets 357    248       Total Cholesterol   115    104     Triglycerides   90    77     HDL Cholesterol   51    50     LDL Cholesterol    46    38     Hemoglobin A1C    6.00 (A)    5.73 (A)    Microalbumin, Urine         12.5   (A) Abnormal value       Comments are available for some flowsheets but are not being displayed.                     Assessment and Plan    Diagnoses and all orders for this visit:    1. Benign essential hypertension (Primary)  -     lisinopril (PRINIVIL,ZESTRIL) 40 MG tablet; Take 1 tablet by mouth Daily for 30 days.  Dispense: 30 tablet; Refill: 4  -     metoprolol tartrate (LOPRESSOR) 50 MG tablet; Take 1 tablet by mouth 2 (Two) Times a Day for 30 days.  Dispense: 60 tablet; Refill: 4    2. Mixed hyperlipidemia  -     rosuvastatin (CRESTOR) 20 MG tablet; Take 1 tablet by mouth Every Night.  Dispense: 30 tablet; Refill: 4  -     CBC  & Differential  -     Comprehensive Metabolic Panel  -     Lipid Panel    3. Controlled type 2 diabetes mellitus without complication, without long-term current use of insulin (CMS/[facility])  -     metFORMIN (GLUCOPHAGE) 500 MG tablet; Take 1 tablet by mouth Daily for 30 days.  Dispense: 30 tablet; Refill: 4  -     Hemoglobin A1c  -     Microalbumin / Creatinine Urine Ratio - Urine, Clean Catch    4. Malaise and fatigue  -     TSH    5. Acute urinary tract infection  -     Urine Culture - Urine, Urine, Clean Catch    Other orders  -     albuterol sulfate  (90 Base) MCG/ACT inhaler; Inhale 2 puffs Every 4 (Four) Hours As Needed for Wheezing.  Dispense: 18 g; Refill: 4    Plan:  1.  Benign essential hypertension: Will continue current medication, low-sodium diet advised, Counseled to regularly check BP at home with goal averaging <130/80.   2.mixed hyperlipidemia: will obtain   fasting CMP and lipid panel.  Diet and exercise counseled,  Will continue current medications  3. Diabetes  Mellitus  : will obtain   fasting CMP  and hba1c 5.7, diet and exercise counseled , Will continue current medications  4.  Fatigue: will obtain tsh ,    5.  Acute urinary tract infection  : will order urine culture ,oral hydration advised,   I spent 30 minutes caring for Glendy on this date of service. This time includes time spent by me in the following activities:preparing for the visit, reviewing tests, performing a medically appropriate examination and/or evaluation , counseling and educating the patient/family/caregiver, ordering medications, tests, or procedures and documenting information in the medical record  Follow Up   Return in about 18 weeks (around 9/15/2021) for Medicare Wellness.  Patient was given instructions and counseling regarding her condition or for health maintenance advice. Please see specific information pulled into the AVS if appropriate.

## 2021-05-14 LAB
ALBUMIN SERPL-MCNC: 4.4 G/DL (ref 3.5–5.2)
ALBUMIN/CREAT UR: 43 MG/G CREAT (ref 0–29)
ALBUMIN/GLOB SERPL: 2.3 G/DL
ALP SERPL-CCNC: 45 U/L (ref 39–117)
ALT SERPL-CCNC: 22 U/L (ref 1–33)
AST SERPL-CCNC: 27 U/L (ref 1–32)
BACTERIA UR CULT: NO GROWTH
BACTERIA UR CULT: NORMAL
BASOPHILS # BLD AUTO: 0.09 10*3/MM3 (ref 0–0.2)
BASOPHILS NFR BLD AUTO: 0.8 % (ref 0–1.5)
BILIRUB SERPL-MCNC: 0.3 MG/DL (ref 0–1.2)
BUN SERPL-MCNC: 18 MG/DL (ref 8–23)
BUN/CREAT SERPL: 15.9 (ref 7–25)
CALCIUM SERPL-MCNC: 9.7 MG/DL (ref 8.6–10.5)
CHLORIDE SERPL-SCNC: 99 MMOL/L (ref 98–107)
CHOLEST SERPL-MCNC: 117 MG/DL (ref 0–200)
CO2 SERPL-SCNC: 26.1 MMOL/L (ref 22–29)
CREAT SERPL-MCNC: 1.13 MG/DL (ref 0.57–1)
CREAT UR-MCNC: 18.7 MG/DL
EOSINOPHIL # BLD AUTO: 0.61 10*3/MM3 (ref 0–0.4)
EOSINOPHIL NFR BLD AUTO: 5.1 % (ref 0.3–6.2)
ERYTHROCYTE [DISTWIDTH] IN BLOOD BY AUTOMATED COUNT: 12.9 % (ref 12.3–15.4)
GLOBULIN SER CALC-MCNC: 1.9 GM/DL
GLUCOSE SERPL-MCNC: 77 MG/DL (ref 65–99)
HBA1C MFR BLD: 5.6 % (ref 4.8–5.6)
HCT VFR BLD AUTO: 33.3 % (ref 34–46.6)
HDLC SERPL-MCNC: 52 MG/DL (ref 40–60)
HGB BLD-MCNC: 11 G/DL (ref 12–15.9)
IMM GRANULOCYTES # BLD AUTO: 0.08 10*3/MM3 (ref 0–0.05)
IMM GRANULOCYTES NFR BLD AUTO: 0.7 % (ref 0–0.5)
LDLC SERPL CALC-MCNC: 41 MG/DL (ref 0–100)
LYMPHOCYTES # BLD AUTO: 4.5 10*3/MM3 (ref 0.7–3.1)
LYMPHOCYTES NFR BLD AUTO: 38 % (ref 19.6–45.3)
MCH RBC QN AUTO: 31.4 PG (ref 26.6–33)
MCHC RBC AUTO-ENTMCNC: 33 G/DL (ref 31.5–35.7)
MCV RBC AUTO: 95.1 FL (ref 79–97)
MICROALBUMIN UR-MCNC: 8.1 UG/ML
MONOCYTES # BLD AUTO: 0.93 10*3/MM3 (ref 0.1–0.9)
MONOCYTES NFR BLD AUTO: 7.8 % (ref 5–12)
NEUTROPHILS # BLD AUTO: 5.64 10*3/MM3 (ref 1.7–7)
NEUTROPHILS NFR BLD AUTO: 47.6 % (ref 42.7–76)
NRBC BLD AUTO-RTO: 0 /100 WBC (ref 0–0.2)
PLATELET # BLD AUTO: 354 10*3/MM3 (ref 140–450)
POTASSIUM SERPL-SCNC: 4.6 MMOL/L (ref 3.5–5.2)
PROT SERPL-MCNC: 6.3 G/DL (ref 6–8.5)
RBC # BLD AUTO: 3.5 10*6/MM3 (ref 3.77–5.28)
SODIUM SERPL-SCNC: 135 MMOL/L (ref 136–145)
TRIGL SERPL-MCNC: 138 MG/DL (ref 0–150)
TSH SERPL DL<=0.005 MIU/L-ACNC: 1.78 UIU/ML (ref 0.27–4.2)
VLDLC SERPL CALC-MCNC: 24 MG/DL (ref 5–40)
WBC # BLD AUTO: 11.85 10*3/MM3 (ref 3.4–10.8)

## 2021-07-27 ENCOUNTER — TELEPHONE (OUTPATIENT)
Dept: INTERNAL MEDICINE | Facility: CLINIC | Age: 71
End: 2021-07-27

## 2021-07-27 NOTE — TELEPHONE ENCOUNTER
Caller: Glendy Garcia    Relationship to patient: Self    Best call back number: [phone]    Additional information or concerns: IS IT SAFE FOR ME TO GET THE COVID VACCINE WITH MY UNDERLYING HEALTH ISSUES?

## 2021-07-28 NOTE — TELEPHONE ENCOUNTER
Tried to contact patient received no answer was unable to leave message due to vm not being set up

## 2021-07-29 NOTE — TELEPHONE ENCOUNTER
PATIENT CALLED BACK IN SHE STATES THAT SHE WOULD LIKE THE OFFICE TO KNOW THAT SHE HAS DECIDED TO WAIT TO GET THE VACCINE SHE STATES THAT SHE WILL JUST CONTINUE TO WERE HER MASK AS OF RIGHT NOW      CALL [phone]

## 2021-09-16 ENCOUNTER — TELEPHONE (OUTPATIENT)
Dept: INTERNAL MEDICINE | Facility: CLINIC | Age: 71
End: 2021-09-16

## 2021-09-16 RX ORDER — ALBUTEROL SULFATE 90 UG/1
1 AEROSOL, METERED RESPIRATORY (INHALATION) EVERY 6 HOURS PRN
Qty: 18 G | Refills: 0 | Status: SHIPPED | OUTPATIENT
Start: 2021-09-16

## 2021-10-09 DIAGNOSIS — I10 BENIGN ESSENTIAL HYPERTENSION: ICD-10-CM

## 2021-10-09 DIAGNOSIS — E78.2 MIXED HYPERLIPIDEMIA: ICD-10-CM

## 2021-10-11 ENCOUNTER — TELEPHONE (OUTPATIENT)
Dept: INTERNAL MEDICINE | Facility: CLINIC | Age: 71
End: 2021-10-11

## 2021-10-11 DIAGNOSIS — E11.9 CONTROLLED TYPE 2 DIABETES MELLITUS WITHOUT COMPLICATION, WITHOUT LONG-TERM CURRENT USE OF INSULIN (HCC): ICD-10-CM

## 2021-10-11 DIAGNOSIS — E78.2 MIXED HYPERLIPIDEMIA: ICD-10-CM

## 2021-10-11 DIAGNOSIS — I10 BENIGN ESSENTIAL HYPERTENSION: ICD-10-CM

## 2021-10-11 RX ORDER — METOPROLOL TARTRATE 50 MG/1
TABLET, FILM COATED ORAL
Qty: 60 TABLET | Refills: 4 | OUTPATIENT
Start: 2021-10-11

## 2021-10-11 RX ORDER — METOPROLOL TARTRATE 50 MG/1
50 TABLET, FILM COATED ORAL 2 TIMES DAILY
Qty: 60 TABLET | Refills: 0 | Status: SHIPPED | OUTPATIENT
Start: 2021-10-11 | End: 2022-12-08

## 2021-10-11 RX ORDER — LISINOPRIL 40 MG/1
40 TABLET ORAL DAILY
Qty: 30 TABLET | Refills: 0 | Status: SHIPPED | OUTPATIENT
Start: 2021-10-11 | End: 2031-01-31

## 2021-10-11 RX ORDER — ROSUVASTATIN CALCIUM 20 MG/1
20 TABLET, COATED ORAL NIGHTLY
Qty: 30 TABLET | Refills: 0 | Status: SHIPPED | OUTPATIENT
Start: 2021-10-11 | End: 2022-12-08

## 2021-10-11 RX ORDER — ROSUVASTATIN CALCIUM 20 MG/1
20 TABLET, COATED ORAL NIGHTLY
Qty: 30 TABLET | Refills: 4 | OUTPATIENT
Start: 2021-10-11

## 2021-10-11 NOTE — TELEPHONE ENCOUNTER
Tried calling patient with no answer and no voicemail, if patient call back please inform that prescriptions have been sent to the pharmacy.

## 2021-10-11 NOTE — TELEPHONE ENCOUNTER
Caller: Glendy Garcia    Relationship: Self    Medication requested (name and dosage):   lisinopril (PRINIVIL,ZESTRIL) 40 MG tablet (  metFORMIN (GLUCOPHAGE) 500 MG tablet ()  metoprolol tartrate (LOPRESSOR) 50 MG tablet (   rosuvastatin (CRESTOR) 20 MG tablet       Pharmacy where request should be sent: [id number] [facility] - [phone]  - [phone]   [phone]    Additional details provided by patient: PATIENT STATED HER  HAS BEEN ADMITTED FOR COVID, AND SHE IS CURRENTLY IN QUARANTINE.     PATIENT STATED SHE WILL SCHEDULE AN APPOINTMENT AFTER SHE IS OUT OF QUARANTINE.      Best call back number: [phone]    Does the patient have less than a 3 day supply:  [x] Yes  [] No    Ruma Dahl Rep   10/11/21 14:56 EDT

## 2021-10-11 NOTE — TELEPHONE ENCOUNTER
LOV 05/12/2021   We would like to see you back in 4 months. Please come 1 week prior for lab work.  When you are in need of a refill, please call your pharmacy and they will send us a request. If you have any questions please call [phone]  Other instructions:  none

## 2021-10-13 RX ORDER — LISINOPRIL 40 MG/1
TABLET ORAL
Qty: 90 TABLET | OUTPATIENT
Start: 2021-10-13

## 2021-10-13 RX ORDER — ROSUVASTATIN CALCIUM 20 MG/1
20 TABLET, COATED ORAL NIGHTLY
Qty: 90 TABLET | OUTPATIENT
Start: 2021-10-13

## 2021-10-13 RX ORDER — METOPROLOL TARTRATE 50 MG/1
TABLET, FILM COATED ORAL
Qty: 180 TABLET | OUTPATIENT
Start: 2021-10-13

## 2021-10-13 NOTE — TELEPHONE ENCOUNTER
Rx Refill Note  Requested Prescriptions     Pending Prescriptions Disp Refills   • metFORMIN (GLUCOPHAGE) 500 MG tablet [Pharmacy Med Name: METFORMIN 500MG TABLETS] 90 tablet      Sig: TAKE 1 TABLET BY MOUTH DAILY   • rosuvastatin (CRESTOR) 20 MG tablet [Pharmacy Med Name: ROSUVASTATIN 20MG TABLETS] 90 tablet      Sig: TAKE 1 TABLET BY MOUTH EVERY NIGHT   • metoprolol tartrate (LOPRESSOR) 50 MG tablet [Pharmacy Med Name: METOPROLOL TARTRATE 50MG TABLETS] 180 tablet      Sig: TAKE 1 TABLET BY MOUTH TWICE DAILY   • lisinopril (PRINIVIL,ZESTRIL) 40 MG tablet [Pharmacy Med Name: LISINOPRIL 40MG TABLETS] 90 tablet      Sig: TAKE 1 TABLET BY MOUTH DAILY      Last office visit with prescribing clinician: 5/12/2021      Next office visit with prescribing clinician: Visit date not found            Margaret Shepherd MA  10/13/21, 09:57 EDT

## 2021-11-03 RX ORDER — ALBUTEROL SULFATE 90 UG/1
AEROSOL, METERED RESPIRATORY (INHALATION)
Qty: 8.5 G | OUTPATIENT
Start: 2021-11-03

## 2021-11-03 NOTE — TELEPHONE ENCOUNTER
Rx Refill Note  Requested Prescriptions     Pending Prescriptions Disp Refills   • albuterol sulfate  (90 Base) MCG/ACT inhaler [Pharmacy Med Name: ALBUTEROL HFA INH (200 PUFFS)8.5GM] 8.5 g      Sig: INHALE 1 PUFF BY MOUTH BY MOUTH EVERY 6 HOURS AS NEEDED FOR WEEZING      Last office visit with prescribing clinician: 5/12/2021      Next office visit with prescribing clinician: Visit date not found            CHINMAY MARROQUIN MA  11/03/21, 08:10 EDT

## 2021-11-08 DIAGNOSIS — E11.9 CONTROLLED TYPE 2 DIABETES MELLITUS WITHOUT COMPLICATION, WITHOUT LONG-TERM CURRENT USE OF INSULIN (HCC): ICD-10-CM

## 2021-11-08 DIAGNOSIS — I10 BENIGN ESSENTIAL HYPERTENSION: ICD-10-CM

## 2021-11-08 DIAGNOSIS — E78.2 MIXED HYPERLIPIDEMIA: ICD-10-CM

## 2021-11-08 RX ORDER — ROSUVASTATIN CALCIUM 20 MG/1
20 TABLET, COATED ORAL NIGHTLY
Qty: 90 TABLET | Refills: 1 | OUTPATIENT
Start: 2021-11-08

## 2021-11-08 RX ORDER — METOPROLOL TARTRATE 50 MG/1
TABLET, FILM COATED ORAL
Qty: 180 TABLET | Refills: 1 | OUTPATIENT
Start: 2021-11-08

## 2021-11-08 NOTE — TELEPHONE ENCOUNTER
Rx Refill Note  Requested Prescriptions     Pending Prescriptions Disp Refills   • metoprolol tartrate (LOPRESSOR) 50 MG tablet [Pharmacy Med Name: METOPROLOL TARTRATE 50MG TABLETS] 180 tablet 1     Sig: TAKE 1 TABLET BY MOUTH TWICE DAILY   • metFORMIN (GLUCOPHAGE) 500 MG tablet [Pharmacy Med Name: METFORMIN 500MG TABLETS] 90 tablet 1     Sig: TAKE 1 TABLET BY MOUTH DAILY   • rosuvastatin (CRESTOR) 20 MG tablet [Pharmacy Med Name: ROSUVASTATIN 20MG TABLETS] 90 tablet 1     Sig: TAKE 1 TABLET BY MOUTH EVERY NIGHT      Last office visit with prescribing clinician: 5/12/2021      Next office visit with prescribing clinician: Visit date not found            CHINMAY MARROQUIN MA  11/08/21, 13:20 EST

## 2022-12-08 ENCOUNTER — OFFICE VISIT (OUTPATIENT)
Dept: CARDIOLOGY | Facility: HOSPITAL | Age: 72
End: 2022-12-08

## 2022-12-08 ENCOUNTER — HOSPITAL ENCOUNTER (OUTPATIENT)
Dept: CARDIOLOGY | Facility: HOSPITAL | Age: 72
Discharge: HOME | End: 2022-12-08

## 2022-12-08 VITALS
SYSTOLIC BLOOD PRESSURE: 135 MMHG | BODY MASS INDEX: 22.22 KG/M2 | DIASTOLIC BLOOD PRESSURE: 75 MMHG | OXYGEN SATURATION: 99 % | HEART RATE: 60 BPM | HEIGHT: 57 IN | TEMPERATURE: 97.1 F | RESPIRATION RATE: 16 BRPM | WEIGHT: 103 LBS

## 2022-12-08 DIAGNOSIS — I73.9 PVD (PERIPHERAL VASCULAR DISEASE) WITH CLAUDICATION: ICD-10-CM

## 2022-12-08 DIAGNOSIS — R00.2 PALPITATIONS: ICD-10-CM

## 2022-12-08 DIAGNOSIS — E11.65 TYPE 2 DIABETES MELLITUS WITH HYPERGLYCEMIA, WITHOUT LONG-TERM CURRENT USE OF INSULIN: ICD-10-CM

## 2022-12-08 DIAGNOSIS — R42 DIZZINESS: ICD-10-CM

## 2022-12-08 DIAGNOSIS — R06.09 DOE (DYSPNEA ON EXERTION): ICD-10-CM

## 2022-12-08 DIAGNOSIS — I10 ESSENTIAL HYPERTENSION: ICD-10-CM

## 2022-12-08 DIAGNOSIS — I65.29 STENOSIS OF CAROTID ARTERY, UNSPECIFIED LATERALITY: ICD-10-CM

## 2022-12-08 DIAGNOSIS — R07.9 CHEST PAIN, UNSPECIFIED TYPE: Primary | ICD-10-CM

## 2022-12-08 PROCEDURE — 99204 OFFICE O/P NEW MOD 45 MIN: CPT | Performed by: NURSE PRACTITIONER

## 2022-12-08 RX ORDER — FLUTICASONE FUROATE, UMECLIDINIUM BROMIDE AND VILANTEROL TRIFENATATE 100; 62.5; 25 UG/1; UG/1; UG/1
1 POWDER RESPIRATORY (INHALATION)
COMMUNITY

## 2022-12-08 RX ORDER — AMLODIPINE BESYLATE 5 MG/1
5 TABLET ORAL DAILY
COMMUNITY

## 2022-12-08 RX ORDER — ROSUVASTATIN CALCIUM 10 MG/1
10 TABLET, COATED ORAL DAILY
COMMUNITY

## 2022-12-08 RX ORDER — DOCUSATE SODIUM 250 MG
250 CAPSULE ORAL DAILY PRN
COMMUNITY

## 2022-12-08 NOTE — PROGRESS NOTES
"[facility]  [facility] Valve [facility]    Chief Complaint  Establish Care, Slow Heart Rate, and Abnormal ECG    Subjective    History of Present Illness {CC  Problem List  Visit  Diagnosis   Encounters  Notes  Medications  Labs  Result Review Imaging  Media :23}     Glendy Garcia is a 72 y.o. female with ?PAF, hypertension, hyperlipidemia, diabetes, COPD, carotid artery stenosis who presents today for evaluation of abnormal EKG at the request of Meenu WESLEY.     Has seen Dr. Grant in the past but unable to fine records. Reports she was followed by Dr. Grant in the past for carotid artery stenosis. She reports that she had a history of palpitations, she thinks she was on a blood thinner in the past. She is not aware of history of AF. She think she might have been on a blood thinner. She reports last time she saw him there was mention of a pacemaker. She reports that she does have some intermittent \"needle\" like pain under her breast for a year. She reports significant MACHADO just walking around her home. Notes orthopnea. PCP recently ordered oxygen. Denies hx of LHC or heart failure    She also reports severe calf pain when she walks. Notes chronic dizziness when she walks and significant headaches      Smokes <1ppd     Cardiac risks: HTN, HLD, DM, tobacco abuse    Objective     Vital Signs:   Vitals:    12/08/22 1440 12/08/22 1441 12/08/22 1443   BP: 148/65 139/65 135/75   BP Location: Right arm Left arm Left arm   Patient Position: Sitting Sitting Standing   Cuff Size: Adult Adult Adult   Pulse: 63 60 60   Resp: 16     Temp: 97.1 °F (36.2 °C)     TempSrc: Temporal     SpO2: 99%     Weight: 46.7 kg (103 lb)     Height: 144.8 cm (57\")       Body mass index is 22.29 kg/m².  Physical Exam  Vitals reviewed.   Constitutional:       Appearance: Normal appearance.   HENT:      Head: Normocephalic.   Neck:      Vascular: No carotid bruit.   Cardiovascular:      Rate and Rhythm: Normal rate and " regular rhythm.      Pulses: Normal pulses.      Heart sounds: Normal heart sounds, S1 normal and S2 normal. No murmur heard.  Pulmonary:      Effort: Pulmonary effort is normal. No respiratory distress.      Breath sounds: Normal breath sounds.   Chest:      Chest wall: No tenderness.   Abdominal:      General: Abdomen is flat.      Palpations: Abdomen is soft.   Musculoskeletal:      Cervical back: Neck supple.      Right lower leg: No edema.      Left lower leg: No edema.   Skin:     General: Skin is warm and dry.   Neurological:      General: No focal deficit present.      Mental Status: She is alert and oriented to person, place, and time. Mental status is at baseline.   Psychiatric:         Mood and Affect: Mood normal.         Behavior: Behavior normal.         Thought Content: Thought content normal.              Result Review  Data Reviewed:{ Labs  Result Review  Imaging  Med Tab  Media :23}     EKG 12/7/2022 showed sinus bradycardia, low voltage QRS, poor R wave progression    Labs 12/7/2022 TSH 1.5, free T4 1.19.  WBC 10.4, hemoglobin 13.1, hematocrit 39.1, platelets 358.  Glucose 80, BUN 12, creatinine 1.13, GFR 52, sodium 137, potassium 4.7, chloride 97, carbon dioxide 22, AST 20, ALT 13.  Total cholesterol 124, triglycerides 80, HDL 56, LDL 52.  Vitamin B12 1016.  Hemoglobin A1c 6, vitamin D 66.5         Assessment and Plan {CC Problem List  Visit Diagnosis  ROS  Review (Popup)  Health Maintenance  Quality  BestPractice  Medications  SmartSets  SnapShot Encounters  Media :23}   1. Chest pain, unspecified type  - Multiple ASCVD risks  - Stress Test With Myocardial Perfusion (1 Day); Future  - Adult Transthoracic Echo Complete W/ Cont if Necessary Per Protocol; Future  - Ambulatory Referral to Cardiology    2. MACHADO (dyspnea on exertion)    - Stress Test With Myocardial Perfusion (1 Day); Future  - Adult Transthoracic Echo Complete W/ Cont if Necessary Per Protocol; Future  - Ambulatory  "Referral to Cardiology    3. Stenosis of carotid artery, unspecified laterality  - Reported \"70%\" stenosis of left ICA  - Ambulatory Referral to Cardiology    4. Dizziness    - Adult Transthoracic Echo Complete W/ Cont if Necessary Per Protocol; Future  - Mobile Cardiac Outpatient Telemetry; Future  - Ambulatory Referral to Cardiology    5. PVD (peripheral vascular disease) with claudication (HCC)    - Duplex Lower Extremity Art / Grafts - Bilateral CAR; Future  - Ambulatory Referral to Cardiology    6. Palpitations  -Still trying to obtain records from Dr. Grant.  There is an encounter with a diagnosis of A. fib but no supporting documentation.  We will do 30-day MCOT  - Stress Test With Myocardial Perfusion (1 Day); Future  - Adult Transthoracic Echo Complete W/ Cont if Necessary Per Protocol; Future  - Mobile Cardiac Outpatient Telemetry; Future    7. Essential hypertension  - Reasonably controlled. Continue current medication  - Stress Test With Myocardial Perfusion (1 Day); Future    8. Type 2 diabetes mellitus with hyperglycemia, without long-term current use of insulin (HCC)  - Recent a1c 6  - Stress Test With Myocardial Perfusion (1 Day); Future        Follow Up {Instructions Charge Capture  Follow-up Communications :23}   Return if symptoms worsen or fail to improve.    Patient was given instructions and counseling regarding her condition or for health maintenance advice. Please see specific information pulled into the AVS if appropriate.  Advised to call the Heart and Valve Center with any questions, concerns, or worsening symptoms.    "

## 2022-12-08 NOTE — PROGRESS NOTES
[facility] Heart Monitor Documentation    Glendy Garcia  1950  [id number]  12/08/22      [] ZIO XT Patch  Model [id number] Prescribed for N/A Days    · Serial Number: (N + 9 Digits) N   · Apply-By Date on Box:   · USPS Tracking Number:   · USPS Tracking        [] [employer]ian MINI PLUS Mobile Cardiac Telemetry  Model BGMINIPLUS Prescribed for 30 Days    · Serial Number: (BGM + 7 Digits) [id number]  · Shipped-By Date on Box: 12/5/22  · UPS Tracking Number: [id number]  · UPS Tracking      [] [employer] MINI Holter Monitor  Model BGMINIEL Prescribed for N/A Days    · Serial Number: (7 Digits)   · Shipped-By Date on Box:   · UPS Tracking Number: [id number]  · UPS Tracking        This monitor was applied to the patient's chest and checked for proper functioning.  Ms. Glendy Garcia was instructed in the proper use of this monitor.  She was given the opportunity to ask questions and left the office with the device 's instruction manual.    Ilya Otoole MA, 15:28 EST, 12/08/22                  [facility] 8[facility]

## 2022-12-20 ENCOUNTER — APPOINTMENT (OUTPATIENT)
Dept: CARDIOLOGY | Facility: HOSPITAL | Age: 72
End: 2022-12-20

## 2022-12-21 ENCOUNTER — APPOINTMENT (OUTPATIENT)
Dept: CARDIOLOGY | Facility: HOSPITAL | Age: 72
End: 2022-12-21

## 2022-12-23 ENCOUNTER — APPOINTMENT (OUTPATIENT)
Dept: CARDIOLOGY | Facility: HOSPITAL | Age: 72
End: 2022-12-23

## 2022-12-28 ENCOUNTER — HOSPITAL ENCOUNTER (OUTPATIENT)
Dept: CARDIOLOGY | Facility: HOSPITAL | Age: 72
Discharge: HOME | End: 2022-12-28
Admitting: NURSE PRACTITIONER

## 2022-12-28 ENCOUNTER — TELEPHONE (OUTPATIENT)
Dept: CARDIOLOGY | Facility: HOSPITAL | Age: 72
End: 2022-12-28

## 2022-12-28 VITALS
DIASTOLIC BLOOD PRESSURE: 80 MMHG | WEIGHT: 102.95 LBS | BODY MASS INDEX: 22.21 KG/M2 | SYSTOLIC BLOOD PRESSURE: 112 MMHG | HEIGHT: 57 IN | HEART RATE: 84 BPM

## 2022-12-28 DIAGNOSIS — E11.65 TYPE 2 DIABETES MELLITUS WITH HYPERGLYCEMIA, WITHOUT LONG-TERM CURRENT USE OF INSULIN: ICD-10-CM

## 2022-12-28 DIAGNOSIS — I10 ESSENTIAL HYPERTENSION: ICD-10-CM

## 2022-12-28 DIAGNOSIS — R00.2 PALPITATIONS: ICD-10-CM

## 2022-12-28 DIAGNOSIS — R06.09 DOE (DYSPNEA ON EXERTION): ICD-10-CM

## 2022-12-28 DIAGNOSIS — R07.9 CHEST PAIN, UNSPECIFIED TYPE: ICD-10-CM

## 2022-12-28 LAB
BH CV REST NUCLEAR ISOTOPE DOSE: 9.6 MCI
BH CV STRESS BP STAGE 1: NORMAL
BH CV STRESS BP STAGE 3: NORMAL
BH CV STRESS COMMENTS STAGE 1: NORMAL
BH CV STRESS DOSE REGADENOSON STAGE 1: 0.4
BH CV STRESS DURATION MIN STAGE 1: 1
BH CV STRESS DURATION MIN STAGE 2: 1
BH CV STRESS DURATION MIN STAGE 3: 1
BH CV STRESS DURATION MIN STAGE 4: 1
BH CV STRESS DURATION SEC STAGE 1: 0
BH CV STRESS DURATION SEC STAGE 2: 0
BH CV STRESS DURATION SEC STAGE 3: 0
BH CV STRESS DURATION SEC STAGE 4: 0
BH CV STRESS HR STAGE 1: 108
BH CV STRESS HR STAGE 2: 129
BH CV STRESS HR STAGE 3: 125
BH CV STRESS HR STAGE 4: 118
BH CV STRESS NUCLEAR ISOTOPE DOSE: 32.8 MCI
BH CV STRESS O2 STAGE 1: 97
BH CV STRESS O2 STAGE 2: 98
BH CV STRESS O2 STAGE 3: 100
BH CV STRESS O2 STAGE 4: 98
BH CV STRESS PROTOCOL 1: NORMAL
BH CV STRESS RECOVERY BP: NORMAL MMHG
BH CV STRESS RECOVERY HR: 112 BPM
BH CV STRESS RECOVERY O2: 98 %
BH CV STRESS STAGE 1: 1
BH CV STRESS STAGE 2: 2
BH CV STRESS STAGE 3: 3
BH CV STRESS STAGE 4: 4
LV EF NUC BP: 74 %
MAXIMAL PREDICTED HEART RATE: 148 BPM
PERCENT MAX PREDICTED HR: 87.16 %
STRESS BASELINE BP: NORMAL MMHG
STRESS BASELINE HR: 87 BPM
STRESS O2 SAT REST: 97 %
STRESS PERCENT HR: 103 %
STRESS POST ESTIMATED WORKLOAD: 1 METS
STRESS POST EXERCISE DUR MIN: 4 MIN
STRESS POST EXERCISE DUR SEC: 0 SEC
STRESS POST O2 SAT PEAK: 100 %
STRESS POST PEAK BP: NORMAL MMHG
STRESS POST PEAK HR: 129 BPM
STRESS TARGET HR: 126 BPM

## 2022-12-28 PROCEDURE — 25010000002 REGADENOSON 0.4 MG/5ML SOLUTION: Performed by: NURSE PRACTITIONER

## 2022-12-28 PROCEDURE — 93018 CV STRESS TEST I&R ONLY: CPT | Performed by: INTERNAL MEDICINE

## 2022-12-28 PROCEDURE — 0 TECHNETIUM SESTAMIBI: Performed by: NURSE PRACTITIONER

## 2022-12-28 PROCEDURE — 78452 HT MUSCLE IMAGE SPECT MULT: CPT | Performed by: INTERNAL MEDICINE

## 2022-12-28 PROCEDURE — A9500 TC99M SESTAMIBI: HCPCS | Performed by: NURSE PRACTITIONER

## 2022-12-28 PROCEDURE — 93017 CV STRESS TEST TRACING ONLY: CPT

## 2022-12-28 PROCEDURE — 78452 HT MUSCLE IMAGE SPECT MULT: CPT

## 2022-12-28 RX ADMIN — TECHNETIUM TC 99M SESTAMIBI 1 DOSE: 1 INJECTION INTRAVENOUS at 14:40

## 2022-12-28 RX ADMIN — REGADENOSON 0.4 MG: 0.08 INJECTION, SOLUTION INTRAVENOUS at 14:47

## 2022-12-28 RX ADMIN — TECHNETIUM TC 99M SESTAMIBI 1 DOSE: 1 INJECTION INTRAVENOUS at 13:01

## 2022-12-28 NOTE — TELEPHONE ENCOUNTER
Called patient with stress test results which showed no evidence of ischemia, minor diffuse coronary calcifications noted.  She verbalized understanding with no further questions or concerns

## 2023-01-04 ENCOUNTER — OFFICE VISIT (OUTPATIENT)
Dept: ORTHOPEDIC SURGERY | Facility: CLINIC | Age: 73
End: 2023-01-04
Payer: MEDICARE

## 2023-01-04 VITALS
DIASTOLIC BLOOD PRESSURE: 60 MMHG | HEIGHT: 57 IN | BODY MASS INDEX: 22.21 KG/M2 | WEIGHT: 102.95 LBS | SYSTOLIC BLOOD PRESSURE: 114 MMHG

## 2023-01-04 DIAGNOSIS — M17.11 PRIMARY OSTEOARTHRITIS OF RIGHT KNEE: Primary | ICD-10-CM

## 2023-01-04 PROCEDURE — 99204 OFFICE O/P NEW MOD 45 MIN: CPT | Performed by: ORTHOPAEDIC SURGERY

## 2023-01-04 RX ORDER — ROPINIROLE 1 MG/1
TABLET, FILM COATED ORAL
COMMUNITY
Start: 2022-11-11

## 2023-01-04 NOTE — PROGRESS NOTES
[facility] Note    Subjective     Chief Complaint   Patient presents with   • Right Knee - Pain, Initial Evaluation        HPI    Glendy Garcia is a 72 y.o. female who presents with new problem of: right knee pain.  Onset: mechanical fall. The issue has been ongoing for 2 year(s). Pain is a 8/10 on the pain scale. Pain is described as aching, burning, stabbing and shooting. Associated symptoms include pain, swelling, popping, grinding, stiffness and giving way/buckling. The pain is worse with walking, standing, sitting, sleeping, lying on affected side and rising from seated position; resting, heat, assistive device (cane/walker), pain medication and/or NSAID, lying down and elevating the extremity improve the pain. Previous treatments have included: bracing, cane/walker, NSAIDS and steroid injection (last injection 7/28/21-[facility] ).  She is using a wheelchair more and more, and requires either a walker or cane for ambulation.  She has some ongoing heart/vascular issues that are being evaluated, to include carotid artery evaluations.    I have reviewed the following portions of the patient's history and agree with: History of Present Illness and Review of Systems    Patient Active Problem List   Diagnosis   • Benign essential hypertension   • Chronic obstructive pulmonary disease (HCC)   • Arthralgia of left pelvic region or thigh   • Mixed hyperlipidemia   • Neuralgia   • Tobacco use   • Type 2 diabetes mellitus (HCC)   • Chronic major depressive disorder     Past Medical History:   Diagnosis Date   • Abnormal liver enzymes    • Diabetes mellitus (HCC)    • Fibromyositis    • Hypertension    • Insomnia    • Low back pain    • Lymphadenopathy    • UTI (urinary tract infection)       Past Surgical History:   Procedure Laterality Date   • BACK SURGERY     • HYSTERECTOMY     • TONSILLECTOMY        Family History   Problem Relation Age of Onset   • Hypertension Other    • Coronary  artery disease Other    • Learning disabilities Other      Social History     Socioeconomic History   • Marital status:    Tobacco Use   • Smoking status: Every Day     Packs/day: 0.50     Years: 30.00     Pack years: 15.00     Types: Cigarettes   • Smokeless tobacco: Never   Substance and Sexual Activity   • Alcohol use: No   • Drug use: No   • Sexual activity: Defer      Current Outpatient Medications on File Prior to Visit   Medication Sig Dispense Refill   • albuterol sulfate  (90 Base) MCG/ACT inhaler Inhale 1 puff Every 6 (Six) Hours As Needed for Wheezing. 18 g 0   • amLODIPine (NORVASC) 5 MG tablet Take 5 mg by mouth Daily.     • aspirin 81 MG EC tablet Take 1 tablet by mouth Daily.     • docusate sodium (COLACE) 250 MG capsule Take 250 mg by mouth Daily As Needed for Constipation.     • Fluticasone-Umeclidin-Vilant (Trelegy Ellipta) 100-62.5-25 MCG/ACT inhaler Inhale 1 puff Daily.     • gabapentin (NEURONTIN) 100 MG capsule Take 3 capsules by mouth every night at bedtime.     • Lancets (FREESTYLE) lancets      • lisinopril (PRINIVIL,ZESTRIL) 40 MG tablet Take 1 tablet by mouth Daily for 30 days. 30 tablet 0   • metFORMIN (GLUCOPHAGE) 500 MG tablet Take 1 tablet by mouth Daily for 30 days. 30 tablet 0   • metoprolol tartrate (LOPRESSOR) 25 MG tablet Take 25 mg by mouth 2 (Two) Times a Day.     • oxyCODONE-acetaminophen (PERCOCET) 5-325 MG per tablet Take 1 tablet by mouth Every 8 (Eight) Hours.  0   • rOPINIRole (REQUIP) 1 MG tablet      • rosuvastatin (CRESTOR) 10 MG tablet Take 10 mg by mouth Daily.       No current facility-administered medications on file prior to visit.      Allergies   Allergen Reactions   • Celexa [Citalopram Hydrobromide]    • Coreg [Carvedilol]    • Wellbutrin [Bupropion]    • Xanax [Alprazolam]    • Zoloft [Sertraline Hcl]         Review of Systems   Constitutional: Negative for activity change, appetite change, chills, diaphoresis, fatigue, fever and unexpected  weight change.   HENT: Negative for congestion, dental problem, drooling, ear discharge, ear pain, facial swelling, hearing loss, mouth sores, nosebleeds, postnasal drip, rhinorrhea, sinus pressure, sneezing, sore throat, tinnitus, trouble swallowing and voice change.    Eyes: Negative for photophobia, pain, discharge, redness, itching and visual disturbance.   Respiratory: Negative for apnea, cough, choking, chest tightness, shortness of breath, wheezing and stridor.    Cardiovascular: Negative for chest pain, palpitations and leg swelling.   Gastrointestinal: Negative for abdominal distention, abdominal pain, anal bleeding, blood in stool, constipation, diarrhea, nausea, rectal pain and vomiting.   Endocrine: Negative for cold intolerance, heat intolerance, polydipsia, polyphagia and polyuria.   Genitourinary: Negative for decreased urine volume, difficulty urinating, dysuria, enuresis, flank pain, frequency, genital sores, hematuria and urgency.   Musculoskeletal: Positive for arthralgias. Negative for back pain, gait problem, joint swelling, myalgias, neck pain and neck stiffness.   Skin: Negative for color change, pallor, rash and wound.   Allergic/Immunologic: Negative for environmental allergies, food allergies and immunocompromised state.   Neurological: Negative for dizziness, tremors, seizures, syncope, facial asymmetry, speech difficulty, weakness, light-headedness, numbness and headaches.   Hematological: Negative for adenopathy. Does not bruise/bleed easily.   Psychiatric/Behavioral: Negative for agitation, behavioral problems, confusion, decreased concentration, dysphoric mood, hallucinations, self-injury, sleep disturbance and suicidal ideas. The patient is not nervous/anxious and is not hyperactive.         Objective      Physical Exam  /60   Ht 144.8 cm (57.01\")   Wt 46.7 kg (102 lb 15.3 oz)   LMP  (LMP Unknown)   BMI 22.27 kg/m²     Body mass index is 22.27 kg/m².    General:   Mental  Status:  Alert   Appearance: Cooperative, in no acute distress   Build and Nutrition: Thin, deconditioned female   Orientation: Alert and oriented to person, place and time   Posture: Normal   Gait: Sitting in a wheelchair    Integument:   Right knee: No skin lesions, no rash, no ecchymosis    Neurologic:   Sensation:    Right foot: Intact to light touch on the dorsal and plantar aspect   Motor:  Right lower extremity: 5/5 quadriceps, hamstrings, ankle dorsiflexors, and ankle plantar flexors    Lower Extremities:   Right Knee:    Tenderness:  Lateral joint line tenderness    Effusion:  Trace    Swelling:  None    Crepitus:  Positive    Atrophy:  None    Range of motion:  Extension: 0°       Flexion: 110°  Instability:  No varus laxity, no valgus laxity, negative anterior drawer  Deformities:  Valgus      Imaging/Studies      Imaging Results (Last 24 Hours)     Procedure Component Value Units Date/Time    XR Knee 4+ View Right [[id number]] Resulted: 01/04/23 1607     Updated: 01/04/23 1608    Narrative:      Right Knee Radiographs  Indication: right knee pain  Views: Standing AP's and skiers of both knees, with lateral and sunrise   views of the right knee    Comparison: no prior studies available    Findings:   Severe degeneration, with bone-on-bone contact in the lateral compartment,   diffuse osteopenia, no clear acute bony abnormalities, severe valgus   alignment.          Assessment and Plan     Diagnoses and all orders for this visit:    1. Primary osteoarthritis of right knee (Primary)  -     XR Knee 4+ View Right        1. Primary osteoarthritis of right knee        I reviewed my findings with the patient.  Her grandson was also present today.  She has severe end-stage right knee arthritis, with severe valgus alignment.  She is currently undergoing an evaluation for carotid arteries and her heart, which certainly takes precedence over surgical consideration for her knee.  She is going to see me in 3 months if  she is deemed to be medically appropriate for consideration of knee replacement surgery.    Return in about 3 months (around 4/4/2023).      Timo Lara MD  01/04/23  17:01 EST

## 2023-01-06 ENCOUNTER — HOSPITAL ENCOUNTER (OUTPATIENT)
Dept: CARDIOLOGY | Facility: HOSPITAL | Age: 73
Discharge: HOME | End: 2023-01-06
Payer: MEDICARE

## 2023-01-06 ENCOUNTER — TELEPHONE (OUTPATIENT)
Dept: CARDIOLOGY | Facility: HOSPITAL | Age: 73
End: 2023-01-06
Payer: MEDICARE

## 2023-01-06 VITALS — HEIGHT: 57 IN | BODY MASS INDEX: 21.88 KG/M2 | WEIGHT: 101.41 LBS

## 2023-01-06 DIAGNOSIS — R07.9 CHEST PAIN, UNSPECIFIED TYPE: ICD-10-CM

## 2023-01-06 DIAGNOSIS — R42 DIZZINESS: ICD-10-CM

## 2023-01-06 DIAGNOSIS — I73.9 PVD (PERIPHERAL VASCULAR DISEASE) WITH CLAUDICATION: ICD-10-CM

## 2023-01-06 DIAGNOSIS — I65.29 STENOSIS OF CAROTID ARTERY, UNSPECIFIED LATERALITY: Primary | ICD-10-CM

## 2023-01-06 DIAGNOSIS — R06.09 DOE (DYSPNEA ON EXERTION): ICD-10-CM

## 2023-01-06 DIAGNOSIS — R00.2 PALPITATIONS: ICD-10-CM

## 2023-01-06 LAB
ASCENDING AORTA: 3.1 CM
BH CV ECHO MEAS - AO MAX PG: 5.4 MMHG
BH CV ECHO MEAS - AO MEAN PG: 3 MMHG
BH CV ECHO MEAS - AO ROOT DIAM: 2.7 CM
BH CV ECHO MEAS - AO V2 MAX: 116 CM/SEC
BH CV ECHO MEAS - AO V2 VTI: 28.5 CM
BH CV ECHO MEAS - AVA(I,D): 2.9 CM2
BH CV ECHO MEAS - EDV(CUBED): 32.5 ML
BH CV ECHO MEAS - EDV(MOD-SP2): 38 ML
BH CV ECHO MEAS - EDV(MOD-SP4): 33 ML
BH CV ECHO MEAS - EF(MOD-BP): 69 %
BH CV ECHO MEAS - EF(MOD-SP2): 68.4 %
BH CV ECHO MEAS - EF(MOD-SP4): 69.7 %
BH CV ECHO MEAS - ESV(CUBED): 7.9 ML
BH CV ECHO MEAS - ESV(MOD-SP2): 12 ML
BH CV ECHO MEAS - ESV(MOD-SP4): 10 ML
BH CV ECHO MEAS - FS: 37.6 %
BH CV ECHO MEAS - IVS/LVPW: 0.91 CM
BH CV ECHO MEAS - IVSD: 0.9 CM
BH CV ECHO MEAS - LA DIMENSION: 3.9 CM
BH CV ECHO MEAS - LAT PEAK E' VEL: 9.1 CM/SEC
BH CV ECHO MEAS - LV MASS(C)D: 81.8 GRAMS
BH CV ECHO MEAS - LV MAX PG: 5.2 MMHG
BH CV ECHO MEAS - LV MEAN PG: 2 MMHG
BH CV ECHO MEAS - LV V1 MAX: 114 CM/SEC
BH CV ECHO MEAS - LV V1 VTI: 26.7 CM
BH CV ECHO MEAS - LVIDD: 3.2 CM
BH CV ECHO MEAS - LVIDS: 1.99 CM
BH CV ECHO MEAS - LVOT AREA: 3.1 CM2
BH CV ECHO MEAS - LVOT DIAM: 2 CM
BH CV ECHO MEAS - LVPWD: 0.98 CM
BH CV ECHO MEAS - MED PEAK E' VEL: 6.3 CM/SEC
BH CV ECHO MEAS - MV A MAX VEL: 73.1 CM/SEC
BH CV ECHO MEAS - MV DEC SLOPE: 558 CM/SEC2
BH CV ECHO MEAS - MV DEC TIME: 0.2 MSEC
BH CV ECHO MEAS - MV E MAX VEL: 96.7 CM/SEC
BH CV ECHO MEAS - MV E/A: 1.32
BH CV ECHO MEAS - MV P1/2T: 71.9 MSEC
BH CV ECHO MEAS - MVA(P1/2T): 3.1 CM2
BH CV ECHO MEAS - PA ACC SLOPE: 553.5 CM/SEC2
BH CV ECHO MEAS - PA ACC TIME: 0.12 SEC
BH CV ECHO MEAS - PA PR(ACCEL): 26.1 MMHG
BH CV ECHO MEAS - RAP SYSTOLE: 3 MMHG
BH CV ECHO MEAS - SV(LVOT): 83.9 ML
BH CV ECHO MEAS - SV(MOD-SP2): 26 ML
BH CV ECHO MEAS - SV(MOD-SP4): 23 ML
BH CV ECHO MEAS - TAPSE (>1.6): 2.3 CM
BH CV ECHO MEAS - TR MAX PG: 26.9 MMHG
BH CV ECHO MEAS - TR MAX VEL: 258.3 CM/SEC
BH CV ECHO MEASUREMENTS AVERAGE E/E' RATIO: 12.56
BH CV GRAFT BRACHIAL PRESSURE LEFT: 130 MMHG
BH CV GRAFT BRACHIAL PRESSURE RIGHT: 120 MMHG
BH CV LEA LEFT ANT TIBIAL A DISTAL EDV: 1.96 CM/S
BH CV LEA LEFT ANT TIBIAL A DISTAL PSV: 57 CM/S
BH CV LEA LEFT ANT TIBIAL A MID EDV: 0.98 CM/S
BH CV LEA LEFT ANT TIBIAL A MID PSV: 70.2 CM/S
BH CV LEA LEFT ANT TIBIAL A PROX EDV: 0.49 CM/S
BH CV LEA LEFT ANT TIBIAL A PROX PSV: 57.9 CM/S
BH CV LEA LEFT CFA DISTAL PSV: 69 CM/S
BH CV LEA LEFT CFA PROX PSV: 96 CM/S
BH CV LEA LEFT DFA PROX PSV: 42 CM/S
BH CV LEA LEFT PERONEAL  MID EDV: 0.49 CM/S
BH CV LEA LEFT PERONEAL  MID PSV: 39.8 CM/S
BH CV LEA LEFT POPITEAL A  DISTAL EDV: 0 CM/S
BH CV LEA LEFT POPITEAL A  DISTAL PSV: -57 CM/S
BH CV LEA LEFT POPITEAL A  PROX EDV: 1.47 CM/S
BH CV LEA LEFT POPITEAL A  PROX PSV: 67.3 CM/S
BH CV LEA LEFT PTA DISTAL EDV: 0.98 CM/S
BH CV LEA LEFT PTA DISTAL PSV: 40.8 CM/S
BH CV LEA LEFT PTA MID EDV: 0.98 CM/S
BH CV LEA LEFT PTA MID PSV: 69.7 CM/S
BH CV LEA LEFT PTA PRESSURE: 120 MMHG
BH CV LEA LEFT PTA PROX PSV: 50.1 CM/S
BH CV LEA LEFT SFA DISTAL EDV: -0.49 CM/S
BH CV LEA LEFT SFA DISTAL PSV: -64.8 CM/S
BH CV LEA LEFT SFA MID EDV: -0.79 CM/S
BH CV LEA LEFT SFA MID PSV: -96.6 CM/S
BH CV LEA LEFT SFA PROX EDV: -0.79 CM/S
BH CV LEA LEFT SFA PROX PSV: -113 CM/S
BH CV LEA RIGHT ANT TIBIAL A DISTAL EDV: 0.55 CM/S
BH CV LEA RIGHT ANT TIBIAL A DISTAL PSV: 68.4 CM/S
BH CV LEA RIGHT ANT TIBIAL A MID EDV: -0.55 CM/S
BH CV LEA RIGHT ANT TIBIAL A MID PSV: -65.6 CM/S
BH CV LEA RIGHT ANT TIBIAL A PROX EDV: 0.55 CM/S
BH CV LEA RIGHT ANT TIBIAL A PROX PSV: 93.7 CM/S
BH CV LEA RIGHT CFA DISTAL PSV: 116 CM/S
BH CV LEA RIGHT CFA PROX PSV: 140 CM/S
BH CV LEA RIGHT DFA PROX PSV: 64 CM/S
BH CV LEA RIGHT DPA PRESSURE: 120 MMHG
BH CV LEA RIGHT PERONEAL  MID EDV: 0.5 CM/S
BH CV LEA RIGHT PERONEAL  MID PSV: 30.2 CM/S
BH CV LEA RIGHT POPITEAL A  DISTAL EDV: -1.26 CM/S
BH CV LEA RIGHT POPITEAL A  DISTAL PSV: -79.8 CM/S
BH CV LEA RIGHT POPITEAL A  PROX EDV: 1.26 CM/S
BH CV LEA RIGHT POPITEAL A  PROX PSV: 83.6 CM/S
BH CV LEA RIGHT PTA DISTAL EDV: 0 CM/S
BH CV LEA RIGHT PTA DISTAL PSV: -51.1 CM/S
BH CV LEA RIGHT PTA MID EDV: 0 CM/S
BH CV LEA RIGHT PTA MID PSV: 90.8 CM/S
BH CV LEA RIGHT PTA PRESSURE: 120 MMHG
BH CV LEA RIGHT PTA PROX EDV: -0.35 CM/S
BH CV LEA RIGHT PTA PROX PSV: -49.6 CM/S
BH CV LEA RIGHT SFA DISTAL EDV: -0.63 CM/S
BH CV LEA RIGHT SFA DISTAL PSV: -67.3 CM/S
BH CV LEA RIGHT SFA MID EDV: 0 CM/S
BH CV LEA RIGHT SFA MID PSV: -92.4 CM/S
BH CV LEA RIGHT SFA PROX EDV: 0 CM/S
BH CV LEA RIGHT SFA PROX PSV: -166 CM/S
BH CV LOWER ARTERIAL LEFT ABI RATIO: 0.9
BH CV LOWER ARTERIAL RIGHT ABI RATIO: 1
BH CV VAS BP RIGHT ARM: NORMAL MMHG
BH CV XLRA - RV BASE: 2.5 CM
BH CV XLRA - RV LENGTH: 4.7 CM
BH CV XLRA - RV MID: 2.5 CM
BH CV XLRA - TDI S': 12.6 CM/SEC
IVRT: 95 MSEC
LEFT ATRIUM VOLUME INDEX: 20 ML/M2
LEFT GROIN CFA SYS: 96.6 CM/SEC
MAXIMAL PREDICTED HEART RATE: 148 BPM
MAXIMAL PREDICTED HEART RATE: 148 BPM
RIGHT GROIN CFA SYS: 140 CM/SEC
STRESS TARGET HR: 126 BPM
STRESS TARGET HR: 126 BPM

## 2023-01-06 PROCEDURE — 93306 TTE W/DOPPLER COMPLETE: CPT | Performed by: INTERNAL MEDICINE

## 2023-01-06 PROCEDURE — 93925 LOWER EXTREMITY STUDY: CPT | Performed by: INTERNAL MEDICINE

## 2023-01-06 PROCEDURE — 93306 TTE W/DOPPLER COMPLETE: CPT

## 2023-01-06 PROCEDURE — 93925 LOWER EXTREMITY STUDY: CPT

## 2023-01-06 NOTE — TELEPHONE ENCOUNTER
Called patient with arterial duplex/MEHDI and echo results. Normal ABIs and nonobstructive plaque noted. Echo was within acceptable limits. At her office visit, I meant to place a carotid duplex, but this was missed so another order was placed today.  Advised patient that I would let her know when I got the carotid duplex and her monitor results back.  She verbalized understanding with no further questions or concerns

## 2023-01-23 ENCOUNTER — TELEPHONE (OUTPATIENT)
Dept: CARDIOLOGY | Facility: HOSPITAL | Age: 73
End: 2023-01-23
Payer: MEDICARE

## 2023-01-23 NOTE — TELEPHONE ENCOUNTER
Patient has been called 01/12 1/13 01/17 x2 1/18 1/19 & 1/20 unable to contact and schedule for echo will mail pt letter and have notified ordering provider

## 2023-02-06 PROCEDURE — 93228 REMOTE 30 DAY ECG REV/REPORT: CPT | Performed by: INTERNAL MEDICINE

## 2023-04-27 NOTE — TELEPHONE ENCOUNTER
We can refer  Her to pulmonology   General Sunscreen Counseling: I recommended applying a broad spectrum sunscreen with an SPF of 30 or higher daily. SPF 30 sunscreens block approximately 97% of the sun's harmful rays. Sunscreens should be applied at least 15 minutes prior to expected sun exposure and then every 2 hours after that as long as sun exposure continues. If swimming or exercising sunscreen should be reapplied every 45 minutes to an hour after getting wet or sweating. I also recommended a lip balm with a sunscreen. Sun protective clothing can be used in lieu of sunscreen but must be worn the entire time you are exposed to the sun's rays. Detail Level: Generalized

## 2023-08-09 NOTE — TELEPHONE ENCOUNTER
Patient notified that medication will be sent in and she will need a follow up appointment. She said that she will schedule that in August when her grandson goes back to school.    [Normal] : temporomandibular joint is normal [FreeTextEntry1] : Microscopic ear exam with Mastoid debridement:  Right ear: The ear canal was patent and nonobstructed.  The tympanic membrane was intact and noninflamed.  Left ear:  Canal down cavity. Retained keratin and cerumen debrided with a curet and alligator forcep and suction.  Acute purulence and inflammation in the anterior sulcus debrided with suction.  Tympanic membrane integrity could not be determined.

## 2025-05-19 NOTE — ADDENDUM NOTE
Addended by: RUBI RIOS on: 2/27/2018 08:48 AM     Modules accepted: Orders     [facility]  /  PROGRESS NOTE   Patient: Fritz Kaminski  Today's Date: 2025    YOB: 1931  Admission Date: 2025    MRN: [id number]  Inpatient LOS: 3    Attending: Trish Kemp MD  Hospital Day: Hospital Day: 4    Subjective   HISTORY AND SUBJECTIVE COMPLAINTS     Chief Complaint:   Acute on chronic diastolic congestive heart failure (dCHF)     Interval History / Subjective:   As mentioned below    Hospital Course:  The patient is a 89-year-old male with a history of coronary artery disease (CAD), cognitive impairment, type 2 diabetes mellitus (DM II), dyslipidemia, diastolic CHF, and hypertension. He was admitted on the medical floor for acute on chronic diastolic CHF.     He was initially treated for hypertensive urgency and received diuresis for CHF, which was subsequently held due to new-onset acute kidney injury (GABRIEL). Oxygen needs have fluctuated significantly, with a peak requirement of 12 LPM on nasal cannula. The patient was started on ceftriaxone and azithromycin for possible community-acquired pneumonia.     An aspiration event was witnessed on , leading to increased oxygen requirements and diffuse wheezing. Metronidazole was added for anaerobic coverage, and DuoNeb frequency was increased.     Due to worsening oxygen requirement and need for BiPAP support, he is transferred to ICU for higher level of care.     ROS:  Pertinent systems negative except as above.    Objective   PHYSICAL EXAMINATION     Vital 24 Hour Range Most Recent Value   Temperature Temp  Min: 96.9 °F (36.1 °C)  Max: 98.6 °F (37 °C) 98.6 °F (37 °C)   Pulse Pulse  Min: 54  Max: 71 71   Respiratory Resp  Min: 16  Max: 24 (!) 22   Blood Pressure BP  Min: 110/52  Max: 155/68 (!) 155/68   Pulse Oximetry SpO2  Min: 75 %  Max: 96 % 95 %   Arterial BP No data recorded     O2 O2 Flow Rate (L/min)  Av.3 L/min  Min: 5 L/min   Min taken time: 25 1524  Max: 50 L/min   Max taken time: 25  0209 O2 Flow Rate (L/min): 45 L/min     Recorded Intake and Output:  Intake/Output Summary (Last 24 hours) at 5/19/2025 0319  Last data filed at 5/19/2025 0001  Gross per 24 hour   Intake 1330 ml   Output 150 ml   Net 1180 ml      Recorded Last Stool Occurrence: 1 (05/18/25 1807)     Vital Most Recent Value First Value   Weight 90.7 kg (199 lb 15.3 oz) Weight: 90 kg (198 lb 6.6 oz)   Height 5' 8\" (172.7 cm) Height: 5' 8\" (172.7 cm)   BMI 30.4 N/A     General:  Resting in bed without acute distress. Very hard of hearing.   HEENT: Normocephalic, atraumatic, PERRL, intact extraocular movement.  Neck:  Trachea is midline. No adenopathy.    Cardiovascular:  Regular rate and rhythm, normal S1, S2, no added sounds or murmurs.  Respiratory: Clear to auscultation bilaterally.  No wheezes, rales or rhonchi. Decreased breath sounds in BL bases.  Gastrointestinal:  Soft, nontender and nondistended, no hepatomegaly or splenomegaly. bowel sounds present.  Neuro:   Alert and Oriented to time, place, person. CN II-XII intact. no focal weakness or sensory deficits.   Skin:  Warm and dry without rash.  Extremities: Pitting edema of the lower extremities bilaterally, distal pulses intact.      TEST RESULTS     I have completed a comprehensive review of all available laboratory results and imaging studies in the [employer] system, summarized as follows:     Laboratory Values:  Hematology: Decrease in hemoglobin (13.7) and hematocrit (43.3); stable platelet count (177).  Electrolytes/Renal Function: Elevated creatinine (2.80) and BUN (48); low calcium (7.8); stable sodium; potassium increased to 4.8.  Glucose Control: Fluctuating glucose levels; HbA1c elevated at 10.4.  Coagulation: Elevated PT (25.8) and INR (2.6).  Urinalysis: Proteinuria (300 mg/dL); small RBC presence.  Liver Function: Low albumin (3.0); normal AST and bilirubin.    Radiology:  CT Chest: Small left pleural effusion, bilateral pleural plaques, and bibasilar infiltrates or  atelectasis noted.       ANCILLARY ORDERS     Diet:  Npo Diet With Exceptions; Medications; Yes, Medical Nutrition Management by Rd (Registered Dietitian)  Telemetry: On  Consults:    IP CONSULT TO SMOKING CESSATION PROGRAM  IP CONSULT TO SOCIAL WORK  Therapy Orders:   No orders of the defined types were placed in this encounter.      ADVANCED DIRECTIVES     Code Status: Selective Treatment/DNR         ASSESSMENT AND PLAN     Hypoxic Respiratory Failure:   Likely related to aspiration pneumonitis with possible community-acquired pneumonia, exacerbated by chronic CHF.  CT chest shows small left pleural effusion, bilateral pleural plaques, and bibasilar infiltrates or atelectasis.  Continue BiPAP for respiratory support.  Continue ceftriaxone, azithromycin and Flagyl for possible CAP; procalcitonin negative.  Negative for COVID, RSV, and influenza.  Monitor respiratory status closely and adjust FiO2 as needed.    Acute on Chronic Diastolic CHF:   Diuresis with Lasix 20 mg IV BID currently on hold due to worsening renal function.  Creatinine increased to 1.85, BUN 34, CO2 35.  Ongoing bilateral lower extremity edema; weight stable.  Cardiology consulted; outpatient follow-up recommended.  Echo shows EF 59%, moderate LVH, no LVOT obstruction, mild valvular disease, dilated ascending aorta (4.1 cm), grade II diastolic dysfunction.  Continue home medications.    Abdominal Pain:   CT abdomen/pelvis reveals bladder lesion, no other acute findings.  Last bowel movement was normal.  Monitor for changes or progression of symptoms.    Hypertensive Urgency:   Continue metoprolol succinate 50 mg PO BID and losartan 50 mg PO daily.  Continue PRN antihypertensives for SBP >180 mmHg.    Diabetes Mellitus Type II:   Home regimen: Lantus 40U QAM and 33U at bedtime.  Current regimen: 15U BID with sliding scale insulin QID AC+HS. - on hold as patient is NPO  Blood sugars controlled; continue monitoring.    Bladder/Prostate Lesion:    Incidental finding on CT; history of hematuria.  Previous cystoscopy indicated vascularity within the prostate as likely source.  Concern for post-renal acute kidney injury secondary to urinary retention, insert jackson catheter.    Atrial Fibrillation:   CHADSVAsc score: 5.  Continue warfarin; pharmacy to manage dosing.    Coronary Artery Disease (CAD):   Status post-CABG 2015, followed by Dr. Shaver.  Previous echo (03/2022) showed EF 70%.  Monitor cardiac status and follow up as needed.    Hyperlipidemia:   Continue atorvastatin as per home regimen.    Smoking status: Not a Tobacco User    Nutrition status: Does Not Meet Criteria for Malnutrition   Body mass index is 30.4 kg/m². - Patient is obese with BMI 30-40  DVT Prophylaxis: Coumadin         I have personally examined the patient and reviewed all pertinent data. Critical Care time: 42 minutes were spent in the care of this patient. Direct, face to face contact, including monitoring of multiple complex databases, coordinating with nursing/RT/phardmacy, and formulating a treatment plan. This does not include time spent on procedures and teaching. Patient has a high probability of sudden and significant deterioration requiring urgent intervention.    DI Score  Deterioration Index Score: 50.78  60 - 100 High  40 - 59   Medium  0 - 39     Low  At a score of 60, a patient has an 80% chance of experiencing a composite adverse outcome of requiring rapid response, resuscitation, ICU-level care, or dying in the next 12-38 hours.     Trish Kemp MD  Neuro-Critical Care Medicine  Critical Care Medicine  Internal Medicine    In the interest of transparency, the 21st Century Cures Act requires healthcare organizations to make nearly all medical information readily available to patients. Please remember that this document is intended as a form of “[id number]” communication. Often medical records contain verbiage and abbreviations that might be unfamiliar and without  context. Language may appear direct as it is intended to succinctly relay the clinical opinion of the practitioner.